# Patient Record
Sex: MALE | Race: WHITE | NOT HISPANIC OR LATINO
[De-identification: names, ages, dates, MRNs, and addresses within clinical notes are randomized per-mention and may not be internally consistent; named-entity substitution may affect disease eponyms.]

---

## 2017-10-12 ENCOUNTER — NON-APPOINTMENT (OUTPATIENT)
Age: 62
End: 2017-10-12

## 2017-10-12 ENCOUNTER — LABORATORY RESULT (OUTPATIENT)
Age: 62
End: 2017-10-12

## 2017-10-12 ENCOUNTER — APPOINTMENT (OUTPATIENT)
Dept: FAMILY MEDICINE | Facility: CLINIC | Age: 62
End: 2017-10-12
Payer: COMMERCIAL

## 2017-10-12 VITALS
OXYGEN SATURATION: 98 % | HEIGHT: 65 IN | DIASTOLIC BLOOD PRESSURE: 80 MMHG | BODY MASS INDEX: 32.49 KG/M2 | WEIGHT: 195 LBS | HEART RATE: 57 BPM | SYSTOLIC BLOOD PRESSURE: 134 MMHG

## 2017-10-12 PROCEDURE — 99214 OFFICE O/P EST MOD 30 MIN: CPT | Mod: 25

## 2017-10-12 PROCEDURE — 93000 ELECTROCARDIOGRAM COMPLETE: CPT

## 2017-10-12 PROCEDURE — 36415 COLL VENOUS BLD VENIPUNCTURE: CPT

## 2017-10-16 LAB
25(OH)D3 SERPL-MCNC: 38.7 NG/ML
ALBUMIN SERPL ELPH-MCNC: 4.3 G/DL
ALP BLD-CCNC: 88 U/L
ALT SERPL-CCNC: 21 U/L
ANION GAP SERPL CALC-SCNC: 16 MMOL/L
AST SERPL-CCNC: 25 U/L
B BURGDOR IGG+IGM SER QL IB: NORMAL
BASOPHILS # BLD AUTO: 0.01 K/UL
BASOPHILS NFR BLD AUTO: 0.1 %
BILIRUB SERPL-MCNC: 0.7 MG/DL
BUN SERPL-MCNC: 20 MG/DL
CALCIUM SERPL-MCNC: 9.9 MG/DL
CHLORIDE SERPL-SCNC: 99 MMOL/L
CHOLEST SERPL-MCNC: 141 MG/DL
CHOLEST/HDLC SERPL: 2.9 RATIO
CO2 SERPL-SCNC: 24 MMOL/L
CREAT SERPL-MCNC: 1.03 MG/DL
EOSINOPHIL # BLD AUTO: 0.22 K/UL
EOSINOPHIL NFR BLD AUTO: 3.2 %
GLUCOSE SERPL-MCNC: 87 MG/DL
HBA1C MFR BLD HPLC: 5.5 %
HCT VFR BLD CALC: 42.5 %
HDLC SERPL-MCNC: 48 MG/DL
HGB BLD-MCNC: 14.7 G/DL
IMM GRANULOCYTES NFR BLD AUTO: 0.3 %
LDLC SERPL CALC-MCNC: 58 MG/DL
LYMPHOCYTES # BLD AUTO: 1.56 K/UL
LYMPHOCYTES NFR BLD AUTO: 22.6 %
MAN DIFF?: NORMAL
MCHC RBC-ENTMCNC: 33.6 PG
MCHC RBC-ENTMCNC: 34.6 GM/DL
MCV RBC AUTO: 97 FL
MONOCYTES # BLD AUTO: 0.48 K/UL
MONOCYTES NFR BLD AUTO: 6.9 %
NEUTROPHILS # BLD AUTO: 4.62 K/UL
NEUTROPHILS NFR BLD AUTO: 66.9 %
PLATELET # BLD AUTO: 224 K/UL
POTASSIUM SERPL-SCNC: 4.6 MMOL/L
PROT SERPL-MCNC: 7.5 G/DL
PSA SERPL-MCNC: 1.07 NG/ML
RBC # BLD: 4.38 M/UL
RBC # FLD: 12.8 %
SODIUM SERPL-SCNC: 139 MMOL/L
T4 SERPL-MCNC: 8.8 UG/DL
TRIGL SERPL-MCNC: 174 MG/DL
TSH SERPL-ACNC: 2.06 UIU/ML
URATE SERPL-MCNC: 4.1 MG/DL
VIT B12 SERPL-MCNC: 994 PG/ML
WBC # FLD AUTO: 6.91 K/UL

## 2017-10-18 ENCOUNTER — APPOINTMENT (OUTPATIENT)
Dept: FAMILY MEDICINE | Facility: CLINIC | Age: 62
End: 2017-10-18

## 2019-02-04 ENCOUNTER — APPOINTMENT (OUTPATIENT)
Dept: FAMILY MEDICINE | Facility: CLINIC | Age: 64
End: 2019-02-04
Payer: COMMERCIAL

## 2019-02-04 VITALS
RESPIRATION RATE: 16 BRPM | BODY MASS INDEX: 30.82 KG/M2 | TEMPERATURE: 98.3 F | HEIGHT: 65 IN | WEIGHT: 185 LBS | SYSTOLIC BLOOD PRESSURE: 120 MMHG | OXYGEN SATURATION: 98 % | HEART RATE: 64 BPM | DIASTOLIC BLOOD PRESSURE: 64 MMHG

## 2019-02-04 DIAGNOSIS — K40.90 UNILATERAL INGUINAL HERNIA, W/OUT OBSTRUCTION OR GANGRENE, NOT SPECIFIED AS RECURRENT: ICD-10-CM

## 2019-02-04 PROCEDURE — 99213 OFFICE O/P EST LOW 20 MIN: CPT

## 2019-02-06 ENCOUNTER — APPOINTMENT (OUTPATIENT)
Dept: SURGERY | Facility: CLINIC | Age: 64
End: 2019-02-06
Payer: COMMERCIAL

## 2019-02-06 VITALS
BODY MASS INDEX: 31.49 KG/M2 | HEART RATE: 68 BPM | SYSTOLIC BLOOD PRESSURE: 127 MMHG | WEIGHT: 189 LBS | HEIGHT: 65 IN | DIASTOLIC BLOOD PRESSURE: 80 MMHG | OXYGEN SATURATION: 97 % | TEMPERATURE: 98.6 F

## 2019-02-06 PROCEDURE — ZZZZZ: CPT

## 2019-02-13 ENCOUNTER — OUTPATIENT (OUTPATIENT)
Dept: OUTPATIENT SERVICES | Facility: HOSPITAL | Age: 64
LOS: 1 days | Discharge: HOME | End: 2019-02-13
Payer: COMMERCIAL

## 2019-02-13 VITALS
HEIGHT: 65 IN | RESPIRATION RATE: 18 BRPM | HEART RATE: 60 BPM | WEIGHT: 192.9 LBS | TEMPERATURE: 98 F | DIASTOLIC BLOOD PRESSURE: 76 MMHG | SYSTOLIC BLOOD PRESSURE: 148 MMHG | OXYGEN SATURATION: 99 %

## 2019-02-13 DIAGNOSIS — K40.90 UNILATERAL INGUINAL HERNIA, WITHOUT OBSTRUCTION OR GANGRENE, NOT SPECIFIED AS RECURRENT: ICD-10-CM

## 2019-02-13 DIAGNOSIS — Z98.890 OTHER SPECIFIED POSTPROCEDURAL STATES: Chronic | ICD-10-CM

## 2019-02-13 DIAGNOSIS — S86.009A UNSPECIFIED INJURY OF UNSPECIFIED ACHILLES TENDON, INITIAL ENCOUNTER: Chronic | ICD-10-CM

## 2019-02-13 DIAGNOSIS — G56.01 CARPAL TUNNEL SYNDROME, RIGHT UPPER LIMB: Chronic | ICD-10-CM

## 2019-02-13 LAB
ANION GAP SERPL CALC-SCNC: 5 MMOL/L — SIGNIFICANT CHANGE UP (ref 5–17)
APTT BLD: 32.3 SEC — SIGNIFICANT CHANGE UP (ref 27.5–36.3)
BASOPHILS # BLD AUTO: 0.01 K/UL — SIGNIFICANT CHANGE UP (ref 0–0.2)
BASOPHILS NFR BLD AUTO: 0.2 % — SIGNIFICANT CHANGE UP (ref 0–2)
BUN SERPL-MCNC: 18 MG/DL — SIGNIFICANT CHANGE UP (ref 7–23)
CALCIUM SERPL-MCNC: 8.9 MG/DL — SIGNIFICANT CHANGE UP (ref 8.5–10.1)
CHLORIDE SERPL-SCNC: 108 MMOL/L — SIGNIFICANT CHANGE UP (ref 96–108)
CO2 SERPL-SCNC: 28 MMOL/L — SIGNIFICANT CHANGE UP (ref 22–31)
CREAT SERPL-MCNC: 0.97 MG/DL — SIGNIFICANT CHANGE UP (ref 0.5–1.3)
EOSINOPHIL # BLD AUTO: 0.16 K/UL — SIGNIFICANT CHANGE UP (ref 0–0.5)
EOSINOPHIL NFR BLD AUTO: 3.1 % — SIGNIFICANT CHANGE UP (ref 0–6)
GLUCOSE SERPL-MCNC: 97 MG/DL — SIGNIFICANT CHANGE UP (ref 70–99)
HCT VFR BLD CALC: 41.9 % — SIGNIFICANT CHANGE UP (ref 39–50)
HGB BLD-MCNC: 14.2 G/DL — SIGNIFICANT CHANGE UP (ref 13–17)
IMM GRANULOCYTES NFR BLD AUTO: 0.4 % — SIGNIFICANT CHANGE UP (ref 0–1.5)
INR BLD: 1.06 RATIO — SIGNIFICANT CHANGE UP (ref 0.88–1.16)
LYMPHOCYTES # BLD AUTO: 1.33 K/UL — SIGNIFICANT CHANGE UP (ref 1–3.3)
LYMPHOCYTES # BLD AUTO: 26 % — SIGNIFICANT CHANGE UP (ref 13–44)
MCHC RBC-ENTMCNC: 33.4 PG — SIGNIFICANT CHANGE UP (ref 27–34)
MCHC RBC-ENTMCNC: 33.9 GM/DL — SIGNIFICANT CHANGE UP (ref 32–36)
MCV RBC AUTO: 98.6 FL — SIGNIFICANT CHANGE UP (ref 80–100)
MONOCYTES # BLD AUTO: 0.46 K/UL — SIGNIFICANT CHANGE UP (ref 0–0.9)
MONOCYTES NFR BLD AUTO: 9 % — SIGNIFICANT CHANGE UP (ref 2–14)
NEUTROPHILS # BLD AUTO: 3.13 K/UL — SIGNIFICANT CHANGE UP (ref 1.8–7.4)
NEUTROPHILS NFR BLD AUTO: 61.3 % — SIGNIFICANT CHANGE UP (ref 43–77)
NRBC # BLD: 0 /100 WBCS — SIGNIFICANT CHANGE UP (ref 0–0)
PLATELET # BLD AUTO: 199 K/UL — SIGNIFICANT CHANGE UP (ref 150–400)
POTASSIUM SERPL-MCNC: 4.1 MMOL/L — SIGNIFICANT CHANGE UP (ref 3.5–5.3)
POTASSIUM SERPL-SCNC: 4.1 MMOL/L — SIGNIFICANT CHANGE UP (ref 3.5–5.3)
PROTHROM AB SERPL-ACNC: 11.8 SEC — SIGNIFICANT CHANGE UP (ref 10–12.9)
RBC # BLD: 4.25 M/UL — SIGNIFICANT CHANGE UP (ref 4.2–5.8)
RBC # FLD: 12.3 % — SIGNIFICANT CHANGE UP (ref 10.3–14.5)
SODIUM SERPL-SCNC: 141 MMOL/L — SIGNIFICANT CHANGE UP (ref 135–145)
WBC # BLD: 5.11 K/UL — SIGNIFICANT CHANGE UP (ref 3.8–10.5)
WBC # FLD AUTO: 5.11 K/UL — SIGNIFICANT CHANGE UP (ref 3.8–10.5)

## 2019-02-13 PROCEDURE — 71046 X-RAY EXAM CHEST 2 VIEWS: CPT | Mod: 26

## 2019-02-13 NOTE — H&P PST ADULT - PSH
Achilles tendon injury  Right torn achilles- age 16  Carpal tunnel syndrome of right wrist  2016  H/O colonoscopy  2016  History of open heart surgery  CABG x3- 2015

## 2019-02-13 NOTE — H&P PST ADULT - TEACHING/LEARNING LEARNING PREFERENCES
audio/group instruction/skill demonstration/computer/internet/pictorial/video/written material/individual instruction/verbal instruction

## 2019-02-13 NOTE — H&P PST ADULT - CARDIOVASCULAR COMMENTS
HTN, Hyperlipidemia, CAD- CABG x3 2015 stable follows with cardio Dr Sanchez regularly- clearance for surgery well healed sternal scar

## 2019-02-13 NOTE — H&P PST ADULT - RS GEN PE MLT RESP DETAILS PC
respirations non-labored/breath sounds equal/no rales/no wheezes/clear to auscultation bilaterally/no rhonchi

## 2019-02-13 NOTE — H&P PST ADULT - ASSESSMENT
63 y.o male scheduled for r Laparoscopic possible open right inguinal hernia repair   Plan  1. Stop all NSAIDS, herbal supplements and vitamins for 7 days.  2. NPO at midnight.  3. Take the following medications Metoprolol, Ramipril, Isosorbide with small sips of water on the morning of your procedure/surgery.  4. Use EZ sponges as directed  5. Use mupirocin as directed  6. Labs, EKG, CXR as per surgeon  7. Cardiologist Dr Sanchez clearance for optimization prior to surgery as per surgeon.

## 2019-02-13 NOTE — H&P PST ADULT - HISTORY OF PRESENT ILLNESS
63 y.o male presents for PST with hx right inguinal hernia dx about 2-3 yrs ago, declined surgery at that time, was not causing any discomfort until about 3-4 weeks ago lifting 190lb cylinder for car, felt increased pain in right inguinal area, hernia became more prominent , followed with PCP, referred to surgeon now scheduled for Laparoscopic possible open right inguinal hernia repair

## 2019-02-19 ENCOUNTER — APPOINTMENT (OUTPATIENT)
Dept: SURGERY | Facility: HOSPITAL | Age: 64
End: 2019-02-19

## 2019-02-19 PROBLEM — I25.10 ATHEROSCLEROTIC HEART DISEASE OF NATIVE CORONARY ARTERY WITHOUT ANGINA PECTORIS: Chronic | Status: ACTIVE | Noted: 2019-02-13

## 2019-02-19 PROBLEM — E78.5 HYPERLIPIDEMIA, UNSPECIFIED: Chronic | Status: ACTIVE | Noted: 2019-02-13

## 2019-02-19 PROBLEM — I10 ESSENTIAL (PRIMARY) HYPERTENSION: Chronic | Status: ACTIVE | Noted: 2019-02-13

## 2019-02-28 ENCOUNTER — OUTPATIENT (OUTPATIENT)
Dept: OUTPATIENT SERVICES | Facility: HOSPITAL | Age: 64
LOS: 1 days | Discharge: HOME | End: 2019-02-28
Payer: COMMERCIAL

## 2019-02-28 ENCOUNTER — APPOINTMENT (OUTPATIENT)
Dept: SURGERY | Facility: HOSPITAL | Age: 64
End: 2019-02-28

## 2019-02-28 VITALS
OXYGEN SATURATION: 99 % | WEIGHT: 192.9 LBS | RESPIRATION RATE: 14 BRPM | DIASTOLIC BLOOD PRESSURE: 68 MMHG | SYSTOLIC BLOOD PRESSURE: 106 MMHG | HEART RATE: 53 BPM | TEMPERATURE: 98 F | HEIGHT: 65 IN

## 2019-02-28 VITALS
TEMPERATURE: 98 F | HEART RATE: 67 BPM | DIASTOLIC BLOOD PRESSURE: 70 MMHG | RESPIRATION RATE: 16 BRPM | SYSTOLIC BLOOD PRESSURE: 126 MMHG | OXYGEN SATURATION: 96 %

## 2019-02-28 DIAGNOSIS — Z98.890 OTHER SPECIFIED POSTPROCEDURAL STATES: Chronic | ICD-10-CM

## 2019-02-28 DIAGNOSIS — S86.009A UNSPECIFIED INJURY OF UNSPECIFIED ACHILLES TENDON, INITIAL ENCOUNTER: Chronic | ICD-10-CM

## 2019-02-28 DIAGNOSIS — G56.01 CARPAL TUNNEL SYNDROME, RIGHT UPPER LIMB: Chronic | ICD-10-CM

## 2019-02-28 PROCEDURE — 49650 LAP ING HERNIA REPAIR INIT: CPT

## 2019-02-28 RX ORDER — SODIUM CHLORIDE 9 MG/ML
1000 INJECTION, SOLUTION INTRAVENOUS
Qty: 0 | Refills: 0 | Status: DISCONTINUED | OUTPATIENT
Start: 2019-02-28 | End: 2019-02-28

## 2019-02-28 RX ORDER — MUPIROCIN 20 MG/G
1 OINTMENT TOPICAL ONCE
Qty: 0 | Refills: 0 | Status: DISCONTINUED | OUTPATIENT
Start: 2019-02-28 | End: 2019-03-15

## 2019-02-28 RX ORDER — LISINOPRIL 2.5 MG/1
1 TABLET ORAL
Qty: 0 | Refills: 0 | DISCHARGE
Start: 2019-02-28

## 2019-02-28 RX ORDER — METOPROLOL TARTRATE 50 MG
25 TABLET ORAL DAILY
Qty: 0 | Refills: 0 | Status: DISCONTINUED | OUTPATIENT
Start: 2019-02-28 | End: 2019-03-15

## 2019-02-28 RX ORDER — ATORVASTATIN CALCIUM 80 MG/1
1 TABLET, FILM COATED ORAL
Qty: 0 | Refills: 0 | DISCHARGE
Start: 2019-02-28

## 2019-02-28 RX ORDER — OXYCODONE HYDROCHLORIDE 5 MG/1
1 TABLET ORAL
Qty: 12 | Refills: 0
Start: 2019-02-28 | End: 2019-03-02

## 2019-02-28 RX ORDER — ATORVASTATIN CALCIUM 80 MG/1
40 TABLET, FILM COATED ORAL DAILY
Qty: 0 | Refills: 0 | Status: DISCONTINUED | OUTPATIENT
Start: 2019-02-28 | End: 2019-02-28

## 2019-02-28 RX ORDER — ATORVASTATIN CALCIUM 80 MG/1
1 TABLET, FILM COATED ORAL
Qty: 0 | Refills: 0 | COMMUNITY

## 2019-02-28 RX ORDER — OXYCODONE HYDROCHLORIDE 5 MG/1
10 TABLET ORAL ONCE
Qty: 0 | Refills: 0 | Status: DISCONTINUED | OUTPATIENT
Start: 2019-02-28 | End: 2019-02-28

## 2019-02-28 RX ORDER — ASPIRIN/CALCIUM CARB/MAGNESIUM 324 MG
81 TABLET ORAL DAILY
Qty: 0 | Refills: 0 | Status: DISCONTINUED | OUTPATIENT
Start: 2019-02-28 | End: 2019-03-15

## 2019-02-28 RX ORDER — ONDANSETRON 8 MG/1
4 TABLET, FILM COATED ORAL ONCE
Qty: 0 | Refills: 0 | Status: COMPLETED | OUTPATIENT
Start: 2019-02-28 | End: 2019-02-28

## 2019-02-28 RX ORDER — ISOSORBIDE MONONITRATE 60 MG/1
60 TABLET, EXTENDED RELEASE ORAL DAILY
Qty: 0 | Refills: 0 | Status: DISCONTINUED | OUTPATIENT
Start: 2019-02-28 | End: 2019-03-15

## 2019-02-28 RX ORDER — SODIUM CHLORIDE 9 MG/ML
1000 INJECTION, SOLUTION INTRAVENOUS
Qty: 0 | Refills: 0 | Status: DISCONTINUED | OUTPATIENT
Start: 2019-02-28 | End: 2019-03-15

## 2019-02-28 RX ORDER — FENTANYL CITRATE 50 UG/ML
50 INJECTION INTRAVENOUS
Qty: 0 | Refills: 0 | Status: DISCONTINUED | OUTPATIENT
Start: 2019-02-28 | End: 2019-02-28

## 2019-02-28 RX ORDER — ATORVASTATIN CALCIUM 80 MG/1
40 TABLET, FILM COATED ORAL AT BEDTIME
Qty: 0 | Refills: 0 | Status: DISCONTINUED | OUTPATIENT
Start: 2019-02-28 | End: 2019-03-15

## 2019-02-28 RX ORDER — LISINOPRIL 2.5 MG/1
2.5 TABLET ORAL DAILY
Qty: 0 | Refills: 0 | Status: DISCONTINUED | OUTPATIENT
Start: 2019-02-28 | End: 2019-03-15

## 2019-02-28 RX ADMIN — FENTANYL CITRATE 50 MICROGRAM(S): 50 INJECTION INTRAVENOUS at 14:15

## 2019-02-28 RX ADMIN — ONDANSETRON 4 MILLIGRAM(S): 8 TABLET, FILM COATED ORAL at 15:47

## 2019-02-28 RX ADMIN — FENTANYL CITRATE 50 MICROGRAM(S): 50 INJECTION INTRAVENOUS at 14:00

## 2019-02-28 RX ADMIN — FENTANYL CITRATE 50 MICROGRAM(S): 50 INJECTION INTRAVENOUS at 14:30

## 2019-02-28 RX ADMIN — OXYCODONE HYDROCHLORIDE 10 MILLIGRAM(S): 5 TABLET ORAL at 14:00

## 2019-02-28 RX ADMIN — OXYCODONE HYDROCHLORIDE 10 MILLIGRAM(S): 5 TABLET ORAL at 14:45

## 2019-02-28 NOTE — ASU DISCHARGE PLAN (ADULT/PEDIATRIC). - NURSING INSTRUCTIONS
CALL the DOCTOR:    - Any pain that appears to be getting worse.  -  If you have  not urinated 8 hours after surgery or have any difficulty urinating.   A responsible adult should be with you for the rest of the day and night for your safety and to help you if you needed.  Review attached FACT SHEET if applicable.

## 2019-02-28 NOTE — ASU DISCHARGE PLAN (ADULT/PEDIATRIC). - MEDICATION SUMMARY - MEDICATIONS TO TAKE
I will START or STAY ON the medications listed below when I get home from the hospital:    Abby Aspirin  -- 81 milligram(s) by mouth once a day  -- Indication: For HTN    lisinopril 2.5 mg oral tablet  -- 1 tab(s) by mouth once a day  -- Indication: For HTN    ramipril 2.5 mg oral capsule  -- 1 cap(s) by mouth once a day  -- Indication: For HTN    isosorbide mononitrate 60 mg oral tablet, extended release  -- 1 tab(s) by mouth once a day (in the morning)  -- Indication: For CAD    atorvastatin 40 mg oral tablet  -- 1 tab(s) by mouth once a day  -- Indication: For HLD    metoprolol succinate 25 mg oral tablet, extended release  -- 1 tab(s) by mouth once a day  -- Indication: For HTN    mupirocin 2% nasal ointment  -- 1 application into nose 2 times a day  -- Indication: For other    Multiple Vitamins oral tablet  -- 1 tab(s) by mouth once a day  -- Indication: For other

## 2019-02-28 NOTE — ASU DISCHARGE PLAN (ADULT/PEDIATRIC). - NOTIFY
Unable to Urinate/Fever greater than 101/Bleeding that does not stop/Pain not relieved by Medications

## 2019-02-28 NOTE — BRIEF OPERATIVE NOTE - PROCEDURE
<<-----Click on this checkbox to enter Procedure Laparoscopic inguinal hernia repair  02/28/2019  right with mesh  Active  DGALVIN

## 2019-02-28 NOTE — ASU PATIENT PROFILE, ADULT - TEACHING/LEARNING LEARNING PREFERENCES
video/verbal instruction/written material/audio/individual instruction/skill demonstration/pictorial/computer/internet/group instruction

## 2019-02-28 NOTE — BRIEF OPERATIVE NOTE - POST-OP DX
Inguinal hernia of right side without obstruction or gangrene  02/28/2019  direct incarcerated  Active  Wilner Song

## 2019-03-06 DIAGNOSIS — K40.90 UNILATERAL INGUINAL HERNIA, WITHOUT OBSTRUCTION OR GANGRENE, NOT SPECIFIED AS RECURRENT: ICD-10-CM

## 2019-03-06 DIAGNOSIS — Z95.1 PRESENCE OF AORTOCORONARY BYPASS GRAFT: ICD-10-CM

## 2019-03-06 DIAGNOSIS — I10 ESSENTIAL (PRIMARY) HYPERTENSION: ICD-10-CM

## 2019-03-06 DIAGNOSIS — I25.10 ATHEROSCLEROTIC HEART DISEASE OF NATIVE CORONARY ARTERY WITHOUT ANGINA PECTORIS: ICD-10-CM

## 2019-03-06 DIAGNOSIS — Z87.891 PERSONAL HISTORY OF NICOTINE DEPENDENCE: ICD-10-CM

## 2019-03-06 DIAGNOSIS — E78.5 HYPERLIPIDEMIA, UNSPECIFIED: ICD-10-CM

## 2019-03-06 DIAGNOSIS — Z79.82 LONG TERM (CURRENT) USE OF ASPIRIN: ICD-10-CM

## 2019-03-06 DIAGNOSIS — K40.30 UNILATERAL INGUINAL HERNIA, WITH OBSTRUCTION, WITHOUT GANGRENE, NOT SPECIFIED AS RECURRENT: ICD-10-CM

## 2019-03-20 ENCOUNTER — APPOINTMENT (OUTPATIENT)
Dept: SURGERY | Facility: CLINIC | Age: 64
End: 2019-03-20

## 2019-03-20 ENCOUNTER — APPOINTMENT (OUTPATIENT)
Dept: SURGERY | Facility: CLINIC | Age: 64
End: 2019-03-20
Payer: COMMERCIAL

## 2019-03-20 VITALS
SYSTOLIC BLOOD PRESSURE: 129 MMHG | OXYGEN SATURATION: 97 % | TEMPERATURE: 98.3 F | BODY MASS INDEX: 31.32 KG/M2 | DIASTOLIC BLOOD PRESSURE: 75 MMHG | HEIGHT: 65 IN | WEIGHT: 188 LBS | HEART RATE: 64 BPM

## 2019-03-20 PROCEDURE — 99024 POSTOP FOLLOW-UP VISIT: CPT

## 2019-03-20 NOTE — HISTORY OF PRESENT ILLNESS
[de-identified] : Status post right inguinal hernia repair, laparoscopic with mesh. No complaints. Her issues.

## 2019-03-20 NOTE — REASON FOR VISIT
[Follow-Up] : a follow-up visit [FreeTextEntry1] : A 63-year-old male, status post laparoscopic right inguinal hernia repair with mesh.

## 2019-03-20 NOTE — PHYSICAL EXAM
[de-identified] : NAD [de-identified] : Abdomen soft, nontender port site is healing well. He will hernia site, unremarkable.

## 2019-05-05 ENCOUNTER — RESULT CHARGE (OUTPATIENT)
Age: 64
End: 2019-05-05

## 2019-10-01 ENCOUNTER — LABORATORY RESULT (OUTPATIENT)
Age: 64
End: 2019-10-01

## 2019-10-01 ENCOUNTER — APPOINTMENT (OUTPATIENT)
Dept: FAMILY MEDICINE | Facility: CLINIC | Age: 64
End: 2019-10-01
Payer: COMMERCIAL

## 2019-10-01 VITALS
HEART RATE: 54 BPM | OXYGEN SATURATION: 99 % | WEIGHT: 185 LBS | HEIGHT: 65 IN | SYSTOLIC BLOOD PRESSURE: 122 MMHG | DIASTOLIC BLOOD PRESSURE: 64 MMHG | BODY MASS INDEX: 30.82 KG/M2

## 2019-10-01 PROCEDURE — 99397 PER PM REEVAL EST PAT 65+ YR: CPT | Mod: 25

## 2019-10-01 PROCEDURE — G0444 DEPRESSION SCREEN ANNUAL: CPT

## 2019-10-02 LAB
ALBUMIN SERPL ELPH-MCNC: 4.7 G/DL
ALP BLD-CCNC: 91 U/L
ALT SERPL-CCNC: 27 U/L
ANION GAP SERPL CALC-SCNC: 10 MMOL/L
AST SERPL-CCNC: 24 U/L
B BURGDOR IGG+IGM SER QL IB: NORMAL
BASOPHILS # BLD AUTO: 0.03 K/UL
BASOPHILS NFR BLD AUTO: 0.6 %
BILIRUB SERPL-MCNC: 0.9 MG/DL
BUN SERPL-MCNC: 18 MG/DL
CALCIUM SERPL-MCNC: 10.2 MG/DL
CHLORIDE SERPL-SCNC: 101 MMOL/L
CHOLEST SERPL-MCNC: 148 MG/DL
CHOLEST/HDLC SERPL: 3 RATIO
CO2 SERPL-SCNC: 26 MMOL/L
CREAT SERPL-MCNC: 1.03 MG/DL
EOSINOPHIL # BLD AUTO: 0.28 K/UL
EOSINOPHIL NFR BLD AUTO: 5.3 %
ESTIMATED AVERAGE GLUCOSE: 108 MG/DL
GLUCOSE SERPL-MCNC: 106 MG/DL
HBA1C MFR BLD HPLC: 5.4 %
HCT VFR BLD CALC: 42.4 %
HDLC SERPL-MCNC: 50 MG/DL
HGB BLD-MCNC: 14.2 G/DL
IMM GRANULOCYTES NFR BLD AUTO: 0.4 %
LDLC SERPL CALC-MCNC: 76 MG/DL
LYMPHOCYTES # BLD AUTO: 1.34 K/UL
LYMPHOCYTES NFR BLD AUTO: 25.4 %
MAN DIFF?: NORMAL
MCHC RBC-ENTMCNC: 32.7 PG
MCHC RBC-ENTMCNC: 33.5 GM/DL
MCV RBC AUTO: 97.7 FL
MONOCYTES # BLD AUTO: 0.44 K/UL
MONOCYTES NFR BLD AUTO: 8.3 %
NEUTROPHILS # BLD AUTO: 3.16 K/UL
NEUTROPHILS NFR BLD AUTO: 60 %
PLATELET # BLD AUTO: 213 K/UL
POTASSIUM SERPL-SCNC: 4.7 MMOL/L
PROT SERPL-MCNC: 6.8 G/DL
PSA SERPL-MCNC: 1.24 NG/ML
RBC # BLD: 4.34 M/UL
RBC # FLD: 12.5 %
SODIUM SERPL-SCNC: 137 MMOL/L
T4 SERPL-MCNC: 8.3 UG/DL
TRIGL SERPL-MCNC: 111 MG/DL
TSH SERPL-ACNC: 1.28 UIU/ML
URATE SERPL-MCNC: 4.9 MG/DL
WBC # FLD AUTO: 5.27 K/UL

## 2019-10-16 ENCOUNTER — APPOINTMENT (OUTPATIENT)
Dept: FAMILY MEDICINE | Facility: CLINIC | Age: 64
End: 2019-10-16
Payer: COMMERCIAL

## 2019-10-16 VITALS
DIASTOLIC BLOOD PRESSURE: 80 MMHG | OXYGEN SATURATION: 98 % | BODY MASS INDEX: 30.82 KG/M2 | HEART RATE: 62 BPM | WEIGHT: 185 LBS | HEIGHT: 65 IN | SYSTOLIC BLOOD PRESSURE: 150 MMHG | RESPIRATION RATE: 16 BRPM

## 2019-10-16 PROCEDURE — 90472 IMMUNIZATION ADMIN EACH ADD: CPT

## 2019-10-16 PROCEDURE — 90632 HEPA VACCINE ADULT IM: CPT

## 2019-10-16 PROCEDURE — G0008: CPT

## 2019-10-16 PROCEDURE — 90653 IIV ADJUVANT VACCINE IM: CPT

## 2019-11-05 ENCOUNTER — APPOINTMENT (OUTPATIENT)
Dept: FAMILY MEDICINE | Facility: CLINIC | Age: 64
End: 2019-11-05
Payer: COMMERCIAL

## 2019-11-05 VITALS
HEIGHT: 65 IN | BODY MASS INDEX: 30.82 KG/M2 | DIASTOLIC BLOOD PRESSURE: 80 MMHG | HEART RATE: 58 BPM | WEIGHT: 185 LBS | OXYGEN SATURATION: 98 % | TEMPERATURE: 98.5 F | RESPIRATION RATE: 16 BRPM | SYSTOLIC BLOOD PRESSURE: 130 MMHG

## 2019-11-05 PROCEDURE — 99213 OFFICE O/P EST LOW 20 MIN: CPT

## 2019-11-05 NOTE — HISTORY OF PRESENT ILLNESS
[FreeTextEntry8] : had tooth pulled 5 days ago pain in area of extraction and right neck neg sore throat or fever on amoxil

## 2020-09-14 ENCOUNTER — APPOINTMENT (OUTPATIENT)
Dept: FAMILY MEDICINE | Facility: CLINIC | Age: 65
End: 2020-09-14
Payer: COMMERCIAL

## 2020-09-14 VITALS
BODY MASS INDEX: 30.82 KG/M2 | DIASTOLIC BLOOD PRESSURE: 70 MMHG | SYSTOLIC BLOOD PRESSURE: 114 MMHG | WEIGHT: 185 LBS | OXYGEN SATURATION: 96 % | RESPIRATION RATE: 16 BRPM | HEIGHT: 65 IN

## 2020-09-14 DIAGNOSIS — Z23 ENCOUNTER FOR IMMUNIZATION: ICD-10-CM

## 2020-09-14 PROCEDURE — G0008: CPT

## 2020-09-14 PROCEDURE — 99214 OFFICE O/P EST MOD 30 MIN: CPT | Mod: 25

## 2020-09-14 PROCEDURE — 90662 IIV NO PRSV INCREASED AG IM: CPT

## 2020-09-14 RX ORDER — SALMONELLA TYPHI TY21A 2 [CFU]/[CFU]
CAPSULE, COATED ORAL
Qty: 4 | Refills: 0 | Status: DISCONTINUED | COMMUNITY
Start: 2019-10-16 | End: 2020-09-14

## 2020-09-15 LAB
25(OH)D3 SERPL-MCNC: 37.6 NG/ML
ALBUMIN SERPL ELPH-MCNC: 4.7 G/DL
ALP BLD-CCNC: 73 U/L
ALT SERPL-CCNC: 27 U/L
ANION GAP SERPL CALC-SCNC: 14 MMOL/L
AST SERPL-CCNC: 28 U/L
BASOPHILS # BLD AUTO: 0.02 K/UL
BASOPHILS NFR BLD AUTO: 0.3 %
BILIRUB SERPL-MCNC: 0.7 MG/DL
BUN SERPL-MCNC: 26 MG/DL
CALCIUM SERPL-MCNC: 9.9 MG/DL
CHLORIDE SERPL-SCNC: 104 MMOL/L
CHOLEST SERPL-MCNC: 147 MG/DL
CHOLEST/HDLC SERPL: 3 RATIO
CO2 SERPL-SCNC: 24 MMOL/L
CREAT SERPL-MCNC: 1 MG/DL
EOSINOPHIL # BLD AUTO: 0.22 K/UL
EOSINOPHIL NFR BLD AUTO: 2.8 %
ESTIMATED AVERAGE GLUCOSE: 111 MG/DL
GLUCOSE SERPL-MCNC: 95 MG/DL
HBA1C MFR BLD HPLC: 5.5 %
HCT VFR BLD CALC: 41.5 %
HDLC SERPL-MCNC: 49 MG/DL
HGB BLD-MCNC: 14.1 G/DL
IMM GRANULOCYTES NFR BLD AUTO: 0.3 %
IRON SERPL-MCNC: 95 UG/DL
LDLC SERPL CALC-MCNC: 79 MG/DL
LYMPHOCYTES # BLD AUTO: 1.59 K/UL
LYMPHOCYTES NFR BLD AUTO: 20.2 %
MAN DIFF?: NORMAL
MCHC RBC-ENTMCNC: 34 GM/DL
MCHC RBC-ENTMCNC: 34 PG
MCV RBC AUTO: 100 FL
MONOCYTES # BLD AUTO: 0.61 K/UL
MONOCYTES NFR BLD AUTO: 7.8 %
NEUTROPHILS # BLD AUTO: 5.4 K/UL
NEUTROPHILS NFR BLD AUTO: 68.6 %
PLATELET # BLD AUTO: 207 K/UL
POTASSIUM SERPL-SCNC: 4.5 MMOL/L
PROT SERPL-MCNC: 6.5 G/DL
RBC # BLD: 4.15 M/UL
RBC # FLD: 12.7 %
SARS-COV-2 IGG SERPL IA-ACNC: 0.09 INDEX
SARS-COV-2 IGG SERPL QL IA: NEGATIVE
SODIUM SERPL-SCNC: 142 MMOL/L
T4 SERPL-MCNC: 8.1 UG/DL
TRIGL SERPL-MCNC: 95 MG/DL
TSH SERPL-ACNC: 1.28 UIU/ML
URATE SERPL-MCNC: 4.6 MG/DL
VIT B12 SERPL-MCNC: 963 PG/ML
WBC # FLD AUTO: 7.86 K/UL

## 2021-10-22 ENCOUNTER — APPOINTMENT (OUTPATIENT)
Dept: FAMILY MEDICINE | Facility: CLINIC | Age: 66
End: 2021-10-22
Payer: MEDICARE

## 2021-10-22 VITALS
SYSTOLIC BLOOD PRESSURE: 124 MMHG | HEIGHT: 65 IN | TEMPERATURE: 97.2 F | DIASTOLIC BLOOD PRESSURE: 70 MMHG | BODY MASS INDEX: 31.32 KG/M2 | WEIGHT: 188 LBS | HEART RATE: 68 BPM | OXYGEN SATURATION: 98 %

## 2021-10-22 DIAGNOSIS — M62.838 OTHER MUSCLE SPASM: ICD-10-CM

## 2021-10-22 PROCEDURE — 99214 OFFICE O/P EST MOD 30 MIN: CPT

## 2021-10-22 RX ORDER — CYCLOBENZAPRINE HYDROCHLORIDE 5 MG/1
5 TABLET, FILM COATED ORAL EVERY 8 HOURS
Qty: 21 | Refills: 0 | Status: ACTIVE | COMMUNITY
Start: 2021-10-22 | End: 1900-01-01

## 2021-10-22 NOTE — REVIEW OF SYSTEMS
[Muscle Pain] : muscle pain [Muscle Weakness] : muscle weakness [Back Pain] : back pain [Negative] : Constitutional

## 2021-10-22 NOTE — ASSESSMENT
[FreeTextEntry1] : conservative tx advised\par given muscle relaxer prn\par take steroids for another 7 days to help inflammation\par cont exercise and stretching. \par

## 2021-10-22 NOTE — HISTORY OF PRESENT ILLNESS
[FreeTextEntry8] : PT presenting for left thigh pain x 1 week\par was lifting/moving 8 ft AC unit and pulled his thigh muscle\par went to UC, given steroids which helped but now pain has returned\par able to do all activities but slightly sore\par

## 2021-11-03 ENCOUNTER — APPOINTMENT (OUTPATIENT)
Dept: FAMILY MEDICINE | Facility: CLINIC | Age: 66
End: 2021-11-03
Payer: MEDICARE

## 2021-11-03 VITALS
HEART RATE: 70 BPM | SYSTOLIC BLOOD PRESSURE: 124 MMHG | DIASTOLIC BLOOD PRESSURE: 72 MMHG | HEIGHT: 65 IN | OXYGEN SATURATION: 97 % | TEMPERATURE: 97.4 F | BODY MASS INDEX: 31.32 KG/M2 | WEIGHT: 188 LBS

## 2021-11-03 DIAGNOSIS — I10 ESSENTIAL (PRIMARY) HYPERTENSION: ICD-10-CM

## 2021-11-03 DIAGNOSIS — Z00.00 ENCOUNTER FOR GENERAL ADULT MEDICAL EXAMINATION W/OUT ABNORMAL FINDINGS: ICD-10-CM

## 2021-11-03 DIAGNOSIS — I25.10 ATHEROSCLEROTIC HEART DISEASE OF NATIVE CORONARY ARTERY W/OUT ANGINA PECTORIS: ICD-10-CM

## 2021-11-03 PROCEDURE — 90662 IIV NO PRSV INCREASED AG IM: CPT

## 2021-11-03 PROCEDURE — G0402 INITIAL PREVENTIVE EXAM: CPT

## 2021-11-03 PROCEDURE — G0008: CPT

## 2021-11-03 NOTE — HISTORY OF PRESENT ILLNESS
[FreeTextEntry1] : annual [de-identified] : annual well active regular exercise healthy diet history of htn hyperlipids cad s/p triple bipass follow with cardiology colonoscopy utd non smoker denies chest pain palpitatoins or dyspnea

## 2021-11-03 NOTE — HEALTH RISK ASSESSMENT
[Very Good] : ~his/her~  mood as very good [] : No [No] : In the past 12 months have you used drugs other than those required for medical reasons? No [No falls in past year] : Patient reported no falls in the past year [Change in mental status noted] : No change in mental status noted [Language] : denies difficulty with language [Behavior] : denies difficulty with behavior [Learning/Retaining New Information] : denies difficulty learning/retaining new information [Handling Complex Tasks] : denies difficulty handling complex tasks [Reasoning] : denies difficulty with reasoning [Spatial Ability and Orientation] : denies difficulty with spatial ability and orientation [None] : None [Fully functional (bathing, dressing, toileting, transferring, walking, feeding)] : Fully functional (bathing, dressing, toileting, transferring, walking, feeding) [Fully functional (using the telephone, shopping, preparing meals, housekeeping, doing laundry, using] : Fully functional and needs no help or supervision to perform IADLs (using the telephone, shopping, preparing meals, housekeeping, doing laundry, using transportation, managing medications and managing finances) [Reports changes in hearing] : Reports no changes in hearing [Reports changes in vision] : Reports no changes in vision [Reports changes in dental health] : Reports no changes in dental health

## 2021-11-08 LAB
25(OH)D3 SERPL-MCNC: 33.4 NG/ML
ESTIMATED AVERAGE GLUCOSE: 111 MG/DL
HBA1C MFR BLD HPLC: 5.5 %
PSA SERPL-MCNC: 1.2 NG/ML
T4 SERPL-MCNC: 6.7 UG/DL
TSH SERPL-ACNC: 1.16 UIU/ML

## 2022-04-01 NOTE — H&P PST ADULT - BP NONINVASIVE MEAN (MM HG)
Pt requesting refill of HYDROCHLOROTHIAZIDE 25 MG Tablet  Last Time Medication was Prescribed :  01/17/2022 qty # 90 with 0 refills    Last Office Visit with Provider: 02/22/2022    Recommended to return by Provider: Followup in 3 months, sooner if dizziness worsening    Appt scheduled on 05/24/2022.      Passed protocol, refill approved, sent to pharmacy  Hypertension Medications Protocol Passed 04/01/2022 04:50 AM   Protocol Details  CMP or BMP in past 12 months    Last serum creatinine< 2.0    Appointment in past 6 or next 3 months 100

## 2022-04-04 ENCOUNTER — RX RENEWAL (OUTPATIENT)
Age: 67
End: 2022-04-04

## 2022-07-12 ENCOUNTER — APPOINTMENT (OUTPATIENT)
Dept: FAMILY MEDICINE | Facility: CLINIC | Age: 67
End: 2022-07-12

## 2022-07-12 VITALS
HEART RATE: 67 BPM | WEIGHT: 191 LBS | TEMPERATURE: 97.2 F | DIASTOLIC BLOOD PRESSURE: 70 MMHG | SYSTOLIC BLOOD PRESSURE: 126 MMHG | HEIGHT: 65 IN | BODY MASS INDEX: 31.82 KG/M2 | OXYGEN SATURATION: 96 %

## 2022-07-12 DIAGNOSIS — M54.2 CERVICALGIA: ICD-10-CM

## 2022-07-12 PROCEDURE — 99213 OFFICE O/P EST LOW 20 MIN: CPT

## 2022-07-12 NOTE — PHYSICAL EXAM
[Normal] : no respiratory distress, lungs were clear to auscultation bilaterally and no accessory muscle use [de-identified] : circ intact right arm strong pulses

## 2022-07-12 NOTE — REVIEW OF SYSTEMS
[Negative] : Respiratory [FreeTextEntry9] : pain and decreased rom of cervical spine pain and numbness right arm

## 2022-07-12 NOTE — HISTORY OF PRESENT ILLNESS
[FreeTextEntry8] : pain and numbness of right arm had carpal tunnel surgery with little relief cervical pain

## 2022-07-27 ENCOUNTER — APPOINTMENT (OUTPATIENT)
Dept: MRI IMAGING | Facility: CLINIC | Age: 67
End: 2022-07-27

## 2022-07-27 ENCOUNTER — OUTPATIENT (OUTPATIENT)
Dept: OUTPATIENT SERVICES | Facility: HOSPITAL | Age: 67
LOS: 1 days | End: 2022-07-27

## 2022-07-27 DIAGNOSIS — G56.01 CARPAL TUNNEL SYNDROME, RIGHT UPPER LIMB: Chronic | ICD-10-CM

## 2022-07-27 DIAGNOSIS — S86.009A UNSPECIFIED INJURY OF UNSPECIFIED ACHILLES TENDON, INITIAL ENCOUNTER: Chronic | ICD-10-CM

## 2022-07-27 DIAGNOSIS — M54.2 CERVICALGIA: ICD-10-CM

## 2022-07-27 DIAGNOSIS — Z98.890 OTHER SPECIFIED POSTPROCEDURAL STATES: Chronic | ICD-10-CM

## 2022-07-27 PROCEDURE — 72141 MRI NECK SPINE W/O DYE: CPT | Mod: 26

## 2022-07-28 NOTE — ASU PATIENT PROFILE, ADULT - FALL HARM RISK CONCLUSION
Last visit: 5/16/22  Last Med refill: 6/29/22  Does patient have enough medication for 72 hours: No:     Next Visit Date:  Future Appointments   Date Time Provider Jose Cruz Scruggs   9/19/2022 10:45 AM CARMITA Tobin - [facility]   10/11/2022 10:00 AM Manfred Baldwin  [address] Maintenance   Topic Date Due    Pneumococcal 65+ years Vaccine (1 - PCV) Never done    Diabetic retinal exam  Never done    Colorectal Cancer Screen  Never done    Shingles vaccine (1 of 2) Never done    COVID-19 Vaccine (2 - Booster for Marysol series) 07/08/2021    Diabetic foot exam  03/23/2022    Lipids  06/17/2022    Flu vaccine (1) 09/01/2022    A1C test (Diabetic or Prediabetic)  01/10/2023    Depression Screen  05/16/2023    DTaP/Tdap/Td vaccine (2 - Td or Tdap) 07/10/2023    Hepatitis C screen  Completed    Hepatitis A vaccine  Aged Out    Hib vaccine  Aged Out    Meningococcal (ACWY) vaccine  Aged Out       Hemoglobin A1C (%)   Date Value   01/10/2022 5.6   07/13/2021 6.1   10/28/2020 5.5             ( goal A1C is < 7)   Microalb/Crt.  Ratio (mcg/mg creat)   Date Value   11/02/2020 38 (H)     LDL Cholesterol (mg/dL)   Date Value   06/17/2021 134 (H)   02/18/2020 121       (goal LDL is <100)   AST (U/L)   Date Value   06/01/2022 14     ALT (U/L)   Date Value   06/01/2022 6     BUN (mg/dL)   Date Value   06/01/2022 17     BP Readings from Last 3 Encounters:   06/01/22 (!) 151/84   05/16/22 136/84   01/10/22 132/80          (goal 120/80)    All Future Testing planned in [facility]  Lab Frequency Next Occurrence               Patient Active Problem List:     Back pain     Essential hypertension     COPD (chronic obstructive pulmonary disease) (HCC)     Lung nodule     Procedure refused     Hyperlipidemia     Colonoscopy refused     Atrial fibrillation, chronic (HCC)     Anxiety     Insomnia     Pacemaker battery depletion     Pacemaker     Bradycardia     Constipation     Diet-controlled diabetes mellitus ([facility] [address]     Aneurysm of infrarenal abdominal aorta (HCC)     CLIFTON (acute kidney injury) ([facility] [address]     Hyperkalemia     Acute cholecystitis Universal Safety Interventions

## 2022-08-05 NOTE — H&P PST ADULT - GASTROINTESTINAL COMMENTS
[address] [facility]    Please Schedule the following with: Mckayla  Today's Date:  8/5/22     Patient: Harsh Gibson     YOB: 1981    Patient Home Phone: [phone] (home)    Diagnosis: left paracentral disc extrusion C6-7, left cervical radiculitis     [x]LT     []RT     []OMAR     []Midline    Levels: C6-7 #1    [x]Cervical AMIE 71101, 90165  []L-MBB 12600, 82641  []SI Joint 32869   []C-FACET 47157, 76137, 53008  []L-FACET [id number], 48410  []Interlaminar AMIE 44606     []HIP 33953    []C-MBB  []Transforaminal AMIE 87285  []Neurotomy 41115, 84213, 17806    Attending Provider: Nasima Herrera PA-C    Injection Schedule for: At: [facility]  First Insurance:                                               Pre-cert #:  Second Insurance:                 Pre-cert #:    Comments:  Follow up with ROXANNE Busby 2weeks after procedure: [phone]    SEDATION:       [] IV           [] ORAL     [] Blood Thinner:                 []Diabetic           []Antibiotic:               []Glaucoma:    [] Pacemaker/defib       [] Current Open Wounds, Lacerations or Sores     Allergies: No Known Allergies    Past Medical History:   Diagnosis Date    Paez syndrome           Current Outpatient Medications:     meloxicam (MOBIC) 15 MG tablet, Take 1 tablet by mouth daily as needed for Pain, Disp: 30 tablet, Rfl: 1    methylPREDNISolone (MEDROL, VITOR,) 4 MG tablet, Take by mouth., Disp: 1 kit, Rfl: 0    predniSONE (DELTASONE) 10 MG tablet, Take 1 tablet three times a day for 7 days, take 1 tablet twice a day for 7 days, then take Medrol Dosepak, Disp: 35 tablet, Rfl: 0    pantoprazole (PROTONIX) 40 MG tablet, Take 40 mg by mouth every morning (before breakfast), Disp: , Rfl:
Right inguinal hernia site clear

## 2022-08-11 ENCOUNTER — APPOINTMENT (OUTPATIENT)
Dept: ORTHOPEDIC SURGERY | Facility: CLINIC | Age: 67
End: 2022-08-11

## 2022-08-11 VITALS
BODY MASS INDEX: 31.82 KG/M2 | DIASTOLIC BLOOD PRESSURE: 79 MMHG | HEIGHT: 65 IN | SYSTOLIC BLOOD PRESSURE: 147 MMHG | HEART RATE: 58 BPM | WEIGHT: 191 LBS

## 2022-08-11 PROCEDURE — 72040 X-RAY EXAM NECK SPINE 2-3 VW: CPT

## 2022-08-11 PROCEDURE — 99204 OFFICE O/P NEW MOD 45 MIN: CPT

## 2022-08-11 NOTE — PHYSICAL EXAM
[de-identified] : CONSTITUTIONAL:  Patient is a very pleasant individual who is well-nourished and appears stated age. \par PSYCHIATRIC:  Alert and oriented times three and in no apparent distress, and participates with orthopedic evaluation well.\par HEAD:  Atraumatic and  nonsyndromic in appearance.\par EENT: No thyromegaly, EOMI.\par RESPIRATORY:  Respiratory rate is regular, not dyspneic on examination.\par LYMPHATICS:  There is no cervical or axillary lymphadenopathy.\par INTEGUMENTARY:  Skin is clean, dry, and intact about the bilateral upper extremities and cervical spine. \par VASCULAR:   There is brisk capillary refill about the bilateral upper extremities and radial pulses are 2/4. \par NEUROLOGIC:  Negative L'hirmitte, negative Spurling's sign. There are no pathologic reflexes. There is no decrease in sensation of the bilateral upper extremities on Wartenberg pinwheel examination.  Deep tendon reflexes are well-maintained at +2/4 of the bilateral upper extremities and are symmetric.\par MUSCULOSKELETAL:  There is no visible muscular atrophy.  Manual motor strength is well maintained in the bilateral upper extremities.  Cervical range of motion is well maintained.  The patient ambulates in a non-myelopathic manner. Normal secondary orthopaedic exam of bilateral shoulders, elbows and hands.  Elbow flexion and extension, wrist extension, finger flexion and abduction are well maintained.\par \par   [de-identified] : MRI of the cervical spine has been reviewed from [facility] dated July 27, 2022 shows diffuse cervical spondylosis and a small myelomalacia change posterior to C5-C6 disc space\par \par X-rays of the cervical spine demonstrate loss of cervical lordosis there is cervical kyphosis with multiple levels of cervical degenerative disc disease\par

## 2022-08-11 NOTE — HISTORY OF PRESENT ILLNESS
[Worsening] : worsening [1] : a current pain level of 1/10 [4] : a maximum pain level of 4/10 [Bending] : worsened by bending [Lifting] : worsened by lifting [None] : No relieving factors are noted [Ataxia] : ataxia [Loss of Dexterity] : loss of dexterity [de-identified] : Patient presents for evaluation for known diagnosis of cervical myelopathy patient's been having worsening radiculopathy in the right upper extremity he underwent a carpal tunnel release with no improvement he obtained a cervical MRI that demonstrates C5-C6 cervical myelopathy.  Patient has underlying congenital issue/congenital cervical stenosis.  Presents for surgical conversation.  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 [Incontinence] : no incontinence [Urinary Ret.] : no urinary retention

## 2022-08-11 NOTE — DISCUSSION/SUMMARY
[de-identified] : We have had a very pleasant conversation with regard to cervical surgery with regard to C5-C6 cervical myelopathy patient is a very active  with loss of dexterity and weakness I think being proactive with surgical management in order to halt the progression of cervical myelopathy is very reasonable.  Patient is going to return in 2 to 3 weeks with all of his questions comments concerns.  I am also going to be ordering a cervical CAT scan.

## 2022-08-26 ENCOUNTER — APPOINTMENT (OUTPATIENT)
Dept: CT IMAGING | Facility: CLINIC | Age: 67
End: 2022-08-26

## 2022-08-26 ENCOUNTER — OUTPATIENT (OUTPATIENT)
Dept: OUTPATIENT SERVICES | Facility: HOSPITAL | Age: 67
LOS: 1 days | End: 2022-08-26
Payer: MEDICARE

## 2022-08-26 DIAGNOSIS — Z98.890 OTHER SPECIFIED POSTPROCEDURAL STATES: Chronic | ICD-10-CM

## 2022-08-26 DIAGNOSIS — G95.9 DISEASE OF SPINAL CORD, UNSPECIFIED: ICD-10-CM

## 2022-08-26 DIAGNOSIS — G56.01 CARPAL TUNNEL SYNDROME, RIGHT UPPER LIMB: Chronic | ICD-10-CM

## 2022-08-26 DIAGNOSIS — S86.009A UNSPECIFIED INJURY OF UNSPECIFIED ACHILLES TENDON, INITIAL ENCOUNTER: Chronic | ICD-10-CM

## 2022-08-26 PROCEDURE — 72125 CT NECK SPINE W/O DYE: CPT | Mod: 26

## 2022-08-26 PROCEDURE — 72125 CT NECK SPINE W/O DYE: CPT

## 2022-08-30 ENCOUNTER — APPOINTMENT (OUTPATIENT)
Dept: ORTHOPEDIC SURGERY | Facility: CLINIC | Age: 67
End: 2022-08-30

## 2022-08-30 VITALS — BODY MASS INDEX: 31.82 KG/M2 | HEIGHT: 65 IN | WEIGHT: 191 LBS

## 2022-08-30 PROCEDURE — 99215 OFFICE O/P EST HI 40 MIN: CPT

## 2022-08-30 NOTE — HISTORY OF PRESENT ILLNESS
[de-identified] : Patient presents for surgical conversation with regard to cervical spondylosis and cervical myelopathy patient again states he does Endor is getting progressively worse he notices that he is off balance dexterity is suffering and we have discussed the concept of preventative measures with regard to decompression improves and preventing further/progressive myelopathy and subsequent disability [Worsening] : worsening [___ mths] : [unfilled] month(s) ago [1] : a current pain level of 1/10 [10] : a maximum pain level of 10/10 [Intermit.] : ~He/She~ states the symptoms seem to be intermittent [Bending] : worsened by bending [Lifting] : worsened by lifting [NSAIDs] : relieved by nonsteroidal anti-inflammatory drugs [Rest] : relieved by rest [Ataxia] : no ataxia [Incontinence] : no incontinence [Loss of Dexterity] : good dexterity [Urinary Ret.] : no urinary retention [de-identified] : Cervical extension

## 2022-08-30 NOTE — PHYSICAL EXAM
[Wide-Based] : wide-based [de-identified] : CONSTITUTIONAL:  Patient is a very pleasant individual who is well-nourished and appears stated age. \par PSYCHIATRIC:  Alert and oriented times three and in no apparent distress, and participates with orthopedic evaluation well.\par HEAD:  Atraumatic and  nonsyndromic in appearance.\par EENT: No thyromegaly, EOMI.\par RESPIRATORY:  Respiratory rate is regular, not dyspneic on examination.\par LYMPHATICS:  There is no cervical or axillary lymphadenopathy.\par INTEGUMENTARY:  Skin is clean, dry, and intact about the bilateral upper extremities and cervical spine. \par VASCULAR:   There is brisk capillary refill about the bilateral upper extremities and radial pulses are 2/4. \par NEUROLOGIC: Positive L'hirmitte, positive Spurling's sign. There are no pathologic reflexes. There is a decrease in sensation of the primarily the right upper extremities on Wartenberg pinwheel examination and light touch consistent with a C6 distribution.  Deep tendon reflexes are well-maintained at +2/4 of the bilateral upper extremities and are symmetric.\par MUSCULOSKELETAL:  There is no visible muscular atrophy.  Manual motor strength is well maintained in the bilateral upper extremities however subjectively and manually slightly diminished at approximately 3-4 over 5.  Cervical range of motion is well maintained to approximately 50%.  The patient ambulates in a myelopathic manner. Normal secondary orthopaedic exam of bilateral shoulders, elbows and hands.  Elbow flexion and extension, wrist extension, finger flexion and abduction are well maintained.\par \par   [de-identified] : Previous cervical x-ray has been reviewed from August 11, 2022 2 views of the cervical spine show loss of cervical lordosis patient is presenting with kyphosis and spondylosis primarily from C3-C7 C7-T1 has a large anterior osteophyte.\par Cervical CAT scan has been reviewed from [facility] this is dated August 26, 2022 that demonstrates cervical stenosis with this again pronounced focal kyphosis and severe spinal stenosis at C4-5 and C6-C7 bilateral foraminal stenosis severe from C3-4 through C6-C7.\par MRI of the cervical spine also from [facility] demonstrates again cervical myelopathy primarily behind C5-C6 but it does extend down to C6-C7 and cephalad to C4-C5

## 2022-08-30 NOTE — DISCUSSION/SUMMARY
[de-identified] : We have had a very thorough conversation with regard to ACDF reconstruction of his cervical spine based upon the mechanics and a focal kyphosis of the cervical spine I do think an anterior reconstruction can help restore his cervical lordosis.  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 also the main focus of this operation is to prevent progressive myelopathy and downstream disability patient is aware of this sign and symptom and wishes to pursue surgical management.  He is aware of dysphagia dysphonia etc.  Wishes to pursue surgery is also aware of incomplete resolution of signs and symptoms the potential need for posterior cervical revision surgery with a posterior cervical laminectomy etc.

## 2022-09-15 ENCOUNTER — OUTPATIENT (OUTPATIENT)
Dept: OUTPATIENT SERVICES | Facility: HOSPITAL | Age: 67
LOS: 1 days | End: 2022-09-15
Payer: MEDICARE

## 2022-09-15 VITALS
TEMPERATURE: 98 F | SYSTOLIC BLOOD PRESSURE: 120 MMHG | RESPIRATION RATE: 19 BRPM | OXYGEN SATURATION: 96 % | DIASTOLIC BLOOD PRESSURE: 60 MMHG | HEIGHT: 65 IN | WEIGHT: 197.09 LBS | HEART RATE: 64 BPM

## 2022-09-15 DIAGNOSIS — M47.812 SPONDYLOSIS WITHOUT MYELOPATHY OR RADICULOPATHY, CERVICAL REGION: ICD-10-CM

## 2022-09-15 DIAGNOSIS — Z01.818 ENCOUNTER FOR OTHER PREPROCEDURAL EXAMINATION: ICD-10-CM

## 2022-09-15 DIAGNOSIS — I25.10 ATHEROSCLEROTIC HEART DISEASE OF NATIVE CORONARY ARTERY WITHOUT ANGINA PECTORIS: ICD-10-CM

## 2022-09-15 DIAGNOSIS — M54.12 RADICULOPATHY, CERVICAL REGION: ICD-10-CM

## 2022-09-15 DIAGNOSIS — I10 ESSENTIAL (PRIMARY) HYPERTENSION: ICD-10-CM

## 2022-09-15 DIAGNOSIS — Z98.890 OTHER SPECIFIED POSTPROCEDURAL STATES: Chronic | ICD-10-CM

## 2022-09-15 DIAGNOSIS — Z13.89 ENCOUNTER FOR SCREENING FOR OTHER DISORDER: ICD-10-CM

## 2022-09-15 DIAGNOSIS — Z29.9 ENCOUNTER FOR PROPHYLACTIC MEASURES, UNSPECIFIED: ICD-10-CM

## 2022-09-15 DIAGNOSIS — G56.01 CARPAL TUNNEL SYNDROME, RIGHT UPPER LIMB: Chronic | ICD-10-CM

## 2022-09-15 DIAGNOSIS — S86.009A UNSPECIFIED INJURY OF UNSPECIFIED ACHILLES TENDON, INITIAL ENCOUNTER: Chronic | ICD-10-CM

## 2022-09-15 LAB
A1C WITH ESTIMATED AVERAGE GLUCOSE RESULT: 5.2 % — SIGNIFICANT CHANGE UP (ref 4–5.6)
ANION GAP SERPL CALC-SCNC: 10 MMOL/L — SIGNIFICANT CHANGE UP (ref 5–17)
APTT BLD: 30.3 SEC — SIGNIFICANT CHANGE UP (ref 27.5–35.5)
BASOPHILS # BLD AUTO: 0.02 K/UL — SIGNIFICANT CHANGE UP (ref 0–0.2)
BASOPHILS NFR BLD AUTO: 0.3 % — SIGNIFICANT CHANGE UP (ref 0–2)
BLD GP AB SCN SERPL QL: SIGNIFICANT CHANGE UP
BUN SERPL-MCNC: 19.6 MG/DL — SIGNIFICANT CHANGE UP (ref 8–20)
CALCIUM SERPL-MCNC: 9.4 MG/DL — SIGNIFICANT CHANGE UP (ref 8.4–10.5)
CHLORIDE SERPL-SCNC: 100 MMOL/L — SIGNIFICANT CHANGE UP (ref 98–107)
CO2 SERPL-SCNC: 26 MMOL/L — SIGNIFICANT CHANGE UP (ref 22–29)
CREAT SERPL-MCNC: 0.86 MG/DL — SIGNIFICANT CHANGE UP (ref 0.5–1.3)
EGFR: 94 ML/MIN/1.73M2 — SIGNIFICANT CHANGE UP
EOSINOPHIL # BLD AUTO: 0.2 K/UL — SIGNIFICANT CHANGE UP (ref 0–0.5)
EOSINOPHIL NFR BLD AUTO: 3.1 % — SIGNIFICANT CHANGE UP (ref 0–6)
ESTIMATED AVERAGE GLUCOSE: 103 MG/DL — SIGNIFICANT CHANGE UP (ref 68–114)
GLUCOSE SERPL-MCNC: 91 MG/DL — SIGNIFICANT CHANGE UP (ref 70–99)
HCT VFR BLD CALC: 41.3 % — SIGNIFICANT CHANGE UP (ref 39–50)
HGB BLD-MCNC: 14.3 G/DL — SIGNIFICANT CHANGE UP (ref 13–17)
IMM GRANULOCYTES NFR BLD AUTO: 0.3 % — SIGNIFICANT CHANGE UP (ref 0–0.9)
INR BLD: 1.03 RATIO — SIGNIFICANT CHANGE UP (ref 0.88–1.16)
LYMPHOCYTES # BLD AUTO: 1.4 K/UL — SIGNIFICANT CHANGE UP (ref 1–3.3)
LYMPHOCYTES # BLD AUTO: 22 % — SIGNIFICANT CHANGE UP (ref 13–44)
MCHC RBC-ENTMCNC: 34.4 PG — HIGH (ref 27–34)
MCHC RBC-ENTMCNC: 34.6 GM/DL — SIGNIFICANT CHANGE UP (ref 32–36)
MCV RBC AUTO: 99.3 FL — SIGNIFICANT CHANGE UP (ref 80–100)
MONOCYTES # BLD AUTO: 0.62 K/UL — SIGNIFICANT CHANGE UP (ref 0–0.9)
MONOCYTES NFR BLD AUTO: 9.7 % — SIGNIFICANT CHANGE UP (ref 2–14)
MRSA PCR RESULT.: SIGNIFICANT CHANGE UP
NEUTROPHILS # BLD AUTO: 4.1 K/UL — SIGNIFICANT CHANGE UP (ref 1.8–7.4)
NEUTROPHILS NFR BLD AUTO: 64.6 % — SIGNIFICANT CHANGE UP (ref 43–77)
PLATELET # BLD AUTO: 237 K/UL — SIGNIFICANT CHANGE UP (ref 150–400)
POTASSIUM SERPL-MCNC: 4.7 MMOL/L — SIGNIFICANT CHANGE UP (ref 3.5–5.3)
POTASSIUM SERPL-SCNC: 4.7 MMOL/L — SIGNIFICANT CHANGE UP (ref 3.5–5.3)
PROTHROM AB SERPL-ACNC: 12 SEC — SIGNIFICANT CHANGE UP (ref 10.5–13.4)
RBC # BLD: 4.16 M/UL — LOW (ref 4.2–5.8)
RBC # FLD: 12.2 % — SIGNIFICANT CHANGE UP (ref 10.3–14.5)
S AUREUS DNA NOSE QL NAA+PROBE: SIGNIFICANT CHANGE UP
SODIUM SERPL-SCNC: 136 MMOL/L — SIGNIFICANT CHANGE UP (ref 135–145)
WBC # BLD: 6.36 K/UL — SIGNIFICANT CHANGE UP (ref 3.8–10.5)
WBC # FLD AUTO: 6.36 K/UL — SIGNIFICANT CHANGE UP (ref 3.8–10.5)

## 2022-09-15 PROCEDURE — 93010 ELECTROCARDIOGRAM REPORT: CPT

## 2022-09-15 PROCEDURE — 93005 ELECTROCARDIOGRAM TRACING: CPT

## 2022-09-15 PROCEDURE — G0463: CPT

## 2022-09-15 RX ORDER — RAMIPRIL 5 MG
1 CAPSULE ORAL
Qty: 0 | Refills: 0 | DISCHARGE

## 2022-09-15 RX ORDER — MUPIROCIN 20 MG/G
1 OINTMENT TOPICAL
Qty: 0 | Refills: 0 | DISCHARGE

## 2022-09-15 NOTE — H&P PST ADULT - WEIGHT IN KG
Subjective    Mr. Morrison is 66 y.o. male    Chief Complaint: Elevated PSA    History of Present Illness    Elevated PSA  Patient is here with an elevated PSA. The PSA was drawn2 week(s). He does have a family history of prostate cancer. His AUA Symptom Score is 10 /35. Voiding symptoms include Incomplete emptying, Intermittency, Urgency, Weakened stream, Straining and Nocturia. Denies Frequency. Voiding symptoms began several years  . These have been gradual in onset. none  Previous PSA values are :   No results found for: PSA       PSA- 6.400 (10/02/2021)  PSA- 0.930 (09/19/2020)    The following portions of the patient's history were reviewed and updated as appropriate: allergies, current medications, past family history, past medical history, past social history, past surgical history and problem list.    Review of Systems   Constitutional: Negative for appetite change and fever.   HENT: Negative for hearing loss and sore throat.    Eyes: Negative for pain and redness.   Respiratory: Negative for cough and shortness of breath.    Cardiovascular: Negative for chest pain and leg swelling.   Gastrointestinal: Negative for anal bleeding, nausea and vomiting.   Endocrine: Negative for cold intolerance and heat intolerance.   Genitourinary: Positive for difficulty urinating and urgency. Negative for dysuria, flank pain and hematuria.   Musculoskeletal: Negative for joint swelling and myalgias.   Skin: Negative for color change and rash.   Allergic/Immunologic: Negative for immunocompromised state.   Neurological: Negative for dizziness and speech difficulty.   Hematological: Negative for adenopathy. Does not bruise/bleed easily.   Psychiatric/Behavioral: Negative for dysphoric mood and suicidal ideas.         Current Outpatient Medications:   •  amitriptyline (ELAVIL) 25 MG tablet, Take 25 mg by mouth every night., Disp: , Rfl:   •  amLODIPine (NORVASC) 5 MG tablet, , Disp: , Rfl:   •  cyclobenzaprine (FLEXERIL) 10 MG  "tablet, Take 10 mg by mouth 3 (three) times a day as needed for muscle spasms., Disp: , Rfl:   •  diphenhydrAMINE (BENADRYL) 25 mg capsule, Take 25 mg by mouth every 6 (six) hours as needed for itching., Disp: , Rfl:   •  HYDROcodone-acetaminophen (NORCO) 7.5-325 MG per tablet, , Disp: , Rfl:   •  Lysine HCl (L-LYSINE) 500 MG tablet tablet, Take  by mouth Daily., Disp: , Rfl:   •  melatonin 5 MG tablet tablet, Take 5 mg by mouth., Disp: , Rfl:   •  temazepam (RESTORIL) 15 MG capsule, TAKE 1 CAPSULE AT BEDTIME AS NEEDED FOR INSOMNIA., Disp: , Rfl:   •  valsartan (DIOVAN) 320 MG tablet, , Disp: , Rfl:     Past Medical History:   Diagnosis Date   • History of colon resection    • Hypertension        Past Surgical History:   Procedure Laterality Date   • ANKLE SURGERY     • COLON RESECTION     • COLONOSCOPY N/A 11/21/2016    Procedure: COLONOSCOPY WITH ANESTHESIA;  Surgeon: Oscar Bermudez DO;  Location: [facility] ENDOSCOPY;  Service:    • COLONOSCOPY N/A 11/22/2019    Procedure: COLONOSCOPY WITH ANESTHESIA;  Surgeon: Oscar Bermudez DO;  Location: [facility] ENDOSCOPY;  Service: Gastroenterology       Social History     Socioeconomic History   • Marital status:    Tobacco Use   • Smoking status: Never Smoker   • Smokeless tobacco: Never Used   Vaping Use   • Vaping Use: Never used   Substance and Sexual Activity   • Alcohol use: Yes     Comment: not very often   • Drug use: No   • Sexual activity: Defer       Family History   Problem Relation Age of Onset   • Colon cancer Father    • Diverticulitis Brother        Objective    Temp 97.9 °F (36.6 °C) (Temporal)   Ht 180.3 cm (71\")   Wt 97.1 kg (214 lb)   BMI 29.85 kg/m²     Physical Exam  Genitourinary:     Comments: BÁRBARA 40 gm smooth no nodules              Results for orders placed or performed in visit on 10/18/21   POC Urinalysis Dipstick, Multipro    Specimen: Urine   Result Value Ref Range    Color Yellow Yellow, Straw, Dark Yellow, Casandra    Clarity, UA " Clear Clear    Glucose, UA Negative Negative, 1000 mg/dL (3+) mg/dL    Bilirubin Negative Negative    Ketones, UA Trace (A) Negative    Specific Gravity  1.015 1.005 - 1.030    Blood, UA Negative Negative    pH, Urine 6.0 5.0 - 8.0    Protein, POC Trace (A) Negative mg/dL    Urobilinogen, UA Normal (A) Normal    Nitrite, UA Negative Negative    Leukocytes Negative Negative     Assessment and Plan    Diagnoses and all orders for this visit:    1. Elevated prostate specific antigen (PSA) (Primary)  -     POC Urinalysis Dipstick, Multipro    2. BPH with urinary obstruction    3. Family history of prostate cancer          Patient with significant family history of prostate cancer 2 brothers diagnosed with it.  His PSA jumped from less than 1 to 6.  BÁRBARA is normal.  We will plan for MRI to assess.  He may need MRI fusion biopsy.       89.4

## 2022-09-15 NOTE — H&P PST ADULT - NSICDXFAMILYHX_GEN_ALL_CORE_FT
FAMILY HISTORY:  Father  Still living? Unknown  FH: heart disease, Age at diagnosis: Age Unknown    Mother  Still living? No  Family history of brain cancer, Age at diagnosis: Age Unknown

## 2022-09-15 NOTE — H&P PST ADULT - MUSCULOSKELETAL
details… Limited ROM with neck extension/normal/no joint swelling/no joint erythema/no joint warmth/normal gait/strength 5/5 bilateral upper extremities/strength 5/5 bilateral lower extremities

## 2022-09-15 NOTE — H&P PST ADULT - NSICDXPASTMEDICALHX_GEN_ALL_CORE_FT
PAST MEDICAL HISTORY:  At risk for sleep apnea BANG 5    CAD (coronary artery disease) CABG x3- 2015    HTN (hypertension)     Hyperlipemia      PAST MEDICAL HISTORY:  At risk for sleep apnea BANG 5    CAD (coronary artery disease) CABG x3- 2015    Cervical spondylosis     HTN (hypertension)     Hyperlipemia

## 2022-09-15 NOTE — H&P PST ADULT - CARDIOVASCULAR COMMENTS
HTN, Hyperlipidemia, CAD- CABG x3 2015 stable follows with cardio Dr Sanchez regularly- clearance for surgery

## 2022-09-15 NOTE — H&P PST ADULT - PROBLEM SELECTOR PLAN 2
72 y/o female with PMH of CAD, Afib s/p pacemaker 2020 HLD, HTN, basal cell carcinoma, intraductal papillary mucinous neoplasm, lung ca s/p resection of left lung 2019 scheduled for watchman/MICHAEL with Dr. Rika Gong 9/19/2022.

## 2022-09-15 NOTE — H&P PST ADULT - NSICDXPASTSURGICALHX_GEN_ALL_CORE_FT
PAST SURGICAL HISTORY:  Achilles tendon injury Right torn achilles- age 16    Carpal tunnel syndrome of right wrist 2016    H/O colonoscopy 2016    History of open heart surgery CABG x3- 2015

## 2022-09-15 NOTE — H&P PST ADULT - PROBLEM SELECTOR PLAN 1
72 y/o female with PMH of CAD, Afib s/p pacemaker 2020 HLD, HTN, basal cell carcinoma, intraductal papillary mucinous neoplasm, lung ca s/p resection of left lung 2019 scheduled for watchman/MICHAEL with Dr. Rika Gong 9/19/2022.    - Medical evaluation pending   -Cardiac evaluation pending  -Patient educated on ERP protocol (written/verbal)- verbalized understanding  -Educated on NSAIDS, multivitamins and herbals that increase the risk of bleeding and need to be stopped 7 days before procedure  -Educated on infection prevention  -Covid testing 3-5 days prior to surgery   -Tylenol can be taken 1 week before surgery if needed for pain  -ASA will be held as per cardiologist's instruction  -Verbalized understanding of all instructions

## 2022-09-15 NOTE — H&P PST ADULT - ATTENDING COMMENTS
pt presents for 4 level ACDF secondary to severe spondylosis, stenosis, radiculopathy as well as intractable pain & poor quality of life. Pt aware of dysphagia, dysphonia, adjacent level dz, revision surgery and incomplete resolution of s/sx    I reviewed the patient's imaging before the case. I confirmed with the patient the planned surgical site and side of procedure. I confirmed that the appropriate imaging demonstrating the surgical side and site was available and present for the planned case. The present imaging confirmed the surgical indication for the planned surgery. Secondary provider reviewing the imaging was JUANY [place]

## 2022-09-15 NOTE — H&P PST ADULT - FALL HARM RISK - PT AGE POPULATION HIDDEN
Diffuse bilateral peribronchial airspace consolidation may reflect multilobar bronchopneumonia. Notable labs include Blood cx NGTD, HIV, legionella -, crypto ag -, P-ANCA positive1:1280, IVAN positive 1:640, C3 76, C4 10, elevated CRP and ESR. RMSF IgM positive. Concern for underlying rheumatalogic/autoimmune disease process. Pulmonary renal syndrome vs SLE vs Drug induced SLE vs lymphangitic spread of ?lymphoma  - c/w solumedrol 500mg IV q12h for now  - will attempt to set up bronchoscopy/BAL for Friday although unclear yield given marked response to steroid treatment  - f/u UA  - please check anti-histone antibody  - Titrate down supplemental O2 as tolerated    Clovis Thomason MD  Pulmonary & Critical Care Fellow  [phone] Adult

## 2022-09-15 NOTE — H&P PST ADULT - HISTORY OF PRESENT ILLNESS
Patient presents for surgical conversation with regard to cervical spondylosis and cervical myelopathy patient again states he does Endor is getting progressively worse he notices that he is off balance dexterity is suffering and we have discussed the concept of preventative measures with regard to decompression improves and preventing further/progressive myelopathy and subsequent disability     He states the symptoms are worsening. The patient mentions the symptoms started 6+ month(s) ago. Pain levels include a current pain level of 1/10 and a maximum pain level of 10/10.   He states the symptoms seem to be intermittent.     Modifying factors - worsened by bending and worsened by lifting . Cervical extension. Relieving factors include relieved by nonsteroidal anti-inflammatory drugs and relieved by rest.   Associated Symptoms: no ataxia, no incontinence, good dexterity and no urinary retention.      66 y/o male with PMH CAD, presents with c/o posterior neck pain. Reports neck pain for about 7 years worsening the pastmomths. Radiates down to right fingertips. C/o numbness and tingling to right arm arm to fingertips. Described as sharp, burning and ache. Denies receivig any previous injections/PT. Tylenol PRN with minimal relief. Currentl pain level 5/10 and worse pain he's had 7/10. Pain is worse with sittingup at the counter top. Laying down alleviate pain. No changes in bowel and bladder    Patient presents for surgical conversation with regard to cervical spondylosis and cervical myelopathy patient again states he does Endor is getting progressively worse he notices that he is off balance dexterity is suffering and we have discussed the concept of preventative measures with regard to decompression improves and preventing further/progressive myelopathy and subsequent disability     He states the symptoms are worsening. The patient mentions the symptoms started 6+ month(s) ago. Pain levels include a current pain level of 1/10 and a maximum pain level of 10/10.   He states the symptoms seem to be intermittent.     Modifying factors - worsened by bending and worsened by lifting . Cervical extension. Relieving factors include relieved by nonsteroidal anti-inflammatory drugs and relieved by rest.   Associated Symptoms: no ataxia, no incontinence, good dexterity and no urinary retention.      66 y/o male with PMH CAD s/p CABGx3 2015, HTN, HLD presents with c/o posterior neck pain. Reports neck pain for about 7 years and worsening the past few months. Pain radiates down to right arm and right fingertips. C/o numbness and tingling to right arm to fingertips. Reports carpal tunnel to right wrist s/p surgery but continued to have numbness/tingling to the first 3 fingers. Described pain as sharp, burning and ache. Denies receiving any previous injections to cervical area. Reports taking tylenol PRN with minimal relief. Currently pain level 5/10 and the worse pain he's had is 7/10. Pain is worse with sitting-up at the counter top at home. Laying down alleviate pain. No changes in bowel and bladder. Scheduled for Anterior cervical discectomy fusion C3-4 C6-7 with Dr. Matthews on 10/5/2022.

## 2022-09-21 ENCOUNTER — APPOINTMENT (OUTPATIENT)
Dept: FAMILY MEDICINE | Facility: CLINIC | Age: 67
End: 2022-09-21

## 2022-09-21 VITALS
SYSTOLIC BLOOD PRESSURE: 138 MMHG | BODY MASS INDEX: 31.65 KG/M2 | HEIGHT: 65 IN | WEIGHT: 190 LBS | DIASTOLIC BLOOD PRESSURE: 82 MMHG | HEART RATE: 68 BPM | TEMPERATURE: 98.1 F | OXYGEN SATURATION: 98 %

## 2022-09-21 DIAGNOSIS — Z13.228 ENCOUNTER FOR SCREENING FOR OTHER SUSPECTED ENDOCRINE DISORDER: ICD-10-CM

## 2022-09-21 DIAGNOSIS — Z87.898 PERSONAL HISTORY OF OTHER SPECIFIED CONDITIONS: ICD-10-CM

## 2022-09-21 DIAGNOSIS — Z01.818 ENCOUNTER FOR OTHER PREPROCEDURAL EXAMINATION: ICD-10-CM

## 2022-09-21 DIAGNOSIS — Z13.220 ENCOUNTER FOR SCREENING FOR LIPOID DISORDERS: ICD-10-CM

## 2022-09-21 DIAGNOSIS — M47.812 SPONDYLOSIS W/OUT MYELOPATHY OR RADICULOPATHY, CERVICAL REGION: ICD-10-CM

## 2022-09-21 DIAGNOSIS — Z13.6 ENCOUNTER FOR SCREENING FOR CARDIOVASCULAR DISORDERS: ICD-10-CM

## 2022-09-21 DIAGNOSIS — R06.09 OTHER FORMS OF DYSPNEA: ICD-10-CM

## 2022-09-21 DIAGNOSIS — Z13.0 ENCOUNTER FOR SCREENING FOR OTHER SUSPECTED ENDOCRINE DISORDER: ICD-10-CM

## 2022-09-21 DIAGNOSIS — Z13.29 ENCOUNTER FOR SCREENING FOR OTHER SUSPECTED ENDOCRINE DISORDER: ICD-10-CM

## 2022-09-21 DIAGNOSIS — M27.3 ALVEOLITIS OF JAWS: ICD-10-CM

## 2022-09-21 DIAGNOSIS — I24.9 ACUTE ISCHEMIC HEART DISEASE, UNSPECIFIED: ICD-10-CM

## 2022-09-21 PROCEDURE — 99214 OFFICE O/P EST MOD 30 MIN: CPT

## 2022-09-21 RX ORDER — PREDNISONE 5 MG/1
5 TABLET ORAL TWICE DAILY
Qty: 14 | Refills: 0 | Status: DISCONTINUED | COMMUNITY
Start: 2021-10-22 | End: 2022-09-21

## 2022-09-21 NOTE — ASSESSMENT
[Patient Optimized for Surgery] : Patient optimized for surgery [Modify anti-platelet treatment prior to procedure] : Modify anti-platelet treatment prior to procedure [FreeTextEntry4] : Patient will hold aspirin for 7 days prior to procedure [FreeTextEntry6] : Hold aspirin

## 2022-09-21 NOTE — HISTORY OF PRESENT ILLNESS
[Coronary Artery Disease] : coronary artery disease [No Pertinent Pulmonary History] : no history of asthma, COPD, sleep apnea, or smoking [No Adverse Anesthesia Reaction] : no adverse anesthesia reaction in self or family member [Chronic Anticoagulation] : no chronic anticoagulation [Chronic Kidney Disease] : no chronic kidney disease [Diabetes] : no diabetes [(Patient denies any chest pain, claudication, dyspnea on exertion, orthopnea, palpitations or syncope)] : Patient denies any chest pain, claudication, dyspnea on exertion, orthopnea, palpitations or syncope [FreeTextEntry1] : Cervical laminectomy [FreeTextEntry2] : October 5 [FreeTextEntry3] : Dr. Matthews [FreeTextEntry4] : Patient has chronic shoulder and right arm pain due to herniated disks in the cervical spine to have laminectomy\par \par Status post CABG 2015 patient walks 3 to 4 miles a day briskly without chest pain will has been cleared by cardiology for this procedure

## 2022-09-23 DIAGNOSIS — Z78.9 OTHER SPECIFIED HEALTH STATUS: ICD-10-CM

## 2022-10-04 ENCOUNTER — TRANSCRIPTION ENCOUNTER (OUTPATIENT)
Age: 67
End: 2022-10-04

## 2022-10-04 NOTE — ASU PATIENT PROFILE, ADULT - NSICDXPASTMEDICALHX_GEN_ALL_CORE_FT
PAST MEDICAL HISTORY:  At risk for sleep apnea BANG 5    CAD (coronary artery disease) CABG x3- 2015    Cervical spondylosis     HTN (hypertension)     Hyperlipemia

## 2022-10-05 ENCOUNTER — TRANSCRIPTION ENCOUNTER (OUTPATIENT)
Age: 67
End: 2022-10-05

## 2022-10-05 ENCOUNTER — APPOINTMENT (OUTPATIENT)
Dept: ORTHOPEDIC SURGERY | Facility: HOSPITAL | Age: 67
End: 2022-10-05

## 2022-10-05 ENCOUNTER — INPATIENT (INPATIENT)
Facility: HOSPITAL | Age: 67
LOS: 1 days | Discharge: HOME | DRG: 472 | End: 2022-10-07
Attending: ORTHOPAEDIC SURGERY | Admitting: ORTHOPAEDIC SURGERY
Payer: MEDICARE

## 2022-10-05 ENCOUNTER — NON-APPOINTMENT (OUTPATIENT)
Age: 67
End: 2022-10-05

## 2022-10-05 VITALS
TEMPERATURE: 98 F | WEIGHT: 190.04 LBS | SYSTOLIC BLOOD PRESSURE: 133 MMHG | RESPIRATION RATE: 16 BRPM | DIASTOLIC BLOOD PRESSURE: 71 MMHG | HEART RATE: 63 BPM | OXYGEN SATURATION: 100 % | HEIGHT: 65 IN

## 2022-10-05 DIAGNOSIS — Z98.890 OTHER SPECIFIED POSTPROCEDURAL STATES: Chronic | ICD-10-CM

## 2022-10-05 DIAGNOSIS — G56.01 CARPAL TUNNEL SYNDROME, RIGHT UPPER LIMB: Chronic | ICD-10-CM

## 2022-10-05 DIAGNOSIS — M54.12 RADICULOPATHY, CERVICAL REGION: ICD-10-CM

## 2022-10-05 DIAGNOSIS — S86.009A UNSPECIFIED INJURY OF UNSPECIFIED ACHILLES TENDON, INITIAL ENCOUNTER: Chronic | ICD-10-CM

## 2022-10-05 LAB
BLD GP AB SCN SERPL QL: SIGNIFICANT CHANGE UP
GLUCOSE BLDC GLUCOMTR-MCNC: 118 MG/DL — HIGH (ref 70–99)
GLUCOSE BLDC GLUCOMTR-MCNC: 99 MG/DL — SIGNIFICANT CHANGE UP (ref 70–99)

## 2022-10-05 PROCEDURE — 22551 ARTHRD ANT NTRBDY CERVICAL: CPT

## 2022-10-05 PROCEDURE — 22552 ARTHRD ANT NTRBD CERVICAL EA: CPT | Mod: AS

## 2022-10-05 PROCEDURE — 22853 INSJ BIOMECHANICAL DEVICE: CPT

## 2022-10-05 PROCEDURE — 22552 ARTHRD ANT NTRBD CERVICAL EA: CPT

## 2022-10-05 PROCEDURE — 22846 INSERT SPINE FIXATION DEVICE: CPT | Mod: AS,59

## 2022-10-05 PROCEDURE — 22551 ARTHRD ANT NTRBDY CERVICAL: CPT | Mod: AS

## 2022-10-05 PROCEDURE — 22853 INSJ BIOMECHANICAL DEVICE: CPT | Mod: AS

## 2022-10-05 PROCEDURE — 22846 INSERT SPINE FIXATION DEVICE: CPT | Mod: 59

## 2022-10-05 DEVICE — SPONGE GELFOAM SZ 100 UNCOMPRESSED: Type: IMPLANTABLE DEVICE | Status: FUNCTIONAL

## 2022-10-05 DEVICE — SCREW VAR SELF START 4.35X18MM: Type: IMPLANTABLE DEVICE | Status: FUNCTIONAL

## 2022-10-05 DEVICE — SURGIFOAM PAD SZ 100: Type: IMPLANTABLE DEVICE | Status: FUNCTIONAL

## 2022-10-05 DEVICE — IMPLANTABLE DEVICE: Type: IMPLANTABLE DEVICE | Status: FUNCTIONAL

## 2022-10-05 DEVICE — PIN CAP ACDF TAPERED: Type: IMPLANTABLE DEVICE | Status: FUNCTIONAL

## 2022-10-05 DEVICE — PIN ACDF 60 MM: Type: IMPLANTABLE DEVICE | Status: FUNCTIONAL

## 2022-10-05 DEVICE — OSTEOSELECT DBM PLUS 5CC: Type: IMPLANTABLE DEVICE | Status: FUNCTIONAL

## 2022-10-05 DEVICE — SEALANT FLOSEAL FAST PREP HEMOSTATIC MATRIX 10ML: Type: IMPLANTABLE DEVICE | Status: FUNCTIONAL

## 2022-10-05 RX ORDER — L.ACIDOPH/B.ANIMALIS/B.LONGUM 15B CELL
1 CAPSULE ORAL
Qty: 0 | Refills: 0 | DISCHARGE

## 2022-10-05 RX ORDER — ATORVASTATIN CALCIUM 80 MG/1
40 TABLET, FILM COATED ORAL AT BEDTIME
Refills: 0 | Status: DISCONTINUED | OUTPATIENT
Start: 2022-10-05 | End: 2022-10-07

## 2022-10-05 RX ORDER — RANOLAZINE 500 MG/1
1 TABLET, FILM COATED, EXTENDED RELEASE ORAL
Qty: 0 | Refills: 0 | DISCHARGE

## 2022-10-05 RX ORDER — ONDANSETRON 8 MG/1
4 TABLET, FILM COATED ORAL ONCE
Refills: 0 | Status: DISCONTINUED | OUTPATIENT
Start: 2022-10-05 | End: 2022-10-05

## 2022-10-05 RX ORDER — HYDROMORPHONE HYDROCHLORIDE 2 MG/ML
1 INJECTION INTRAMUSCULAR; INTRAVENOUS; SUBCUTANEOUS ONCE
Refills: 0 | Status: DISCONTINUED | OUTPATIENT
Start: 2022-10-05 | End: 2022-10-05

## 2022-10-05 RX ORDER — PANTOPRAZOLE SODIUM 20 MG/1
0 TABLET, DELAYED RELEASE ORAL
Qty: 0 | Refills: 0 | DISCHARGE

## 2022-10-05 RX ORDER — ISOSORBIDE MONONITRATE 60 MG/1
60 TABLET, EXTENDED RELEASE ORAL DAILY
Refills: 0 | Status: DISCONTINUED | OUTPATIENT
Start: 2022-10-05 | End: 2022-10-07

## 2022-10-05 RX ORDER — DIAZEPAM 5 MG
2 TABLET ORAL ONCE
Refills: 0 | Status: DISCONTINUED | OUTPATIENT
Start: 2022-10-05 | End: 2022-10-05

## 2022-10-05 RX ORDER — KETOROLAC TROMETHAMINE 30 MG/ML
30 SYRINGE (ML) INJECTION ONCE
Refills: 0 | Status: DISCONTINUED | OUTPATIENT
Start: 2022-10-05 | End: 2022-10-05

## 2022-10-05 RX ORDER — CELECOXIB 200 MG/1
200 CAPSULE ORAL EVERY 12 HOURS
Refills: 0 | Status: DISCONTINUED | OUTPATIENT
Start: 2022-10-05 | End: 2022-10-07

## 2022-10-05 RX ORDER — ACETAMINOPHEN 500 MG
975 TABLET ORAL ONCE
Refills: 0 | Status: COMPLETED | OUTPATIENT
Start: 2022-10-05 | End: 2022-10-05

## 2022-10-05 RX ORDER — CEFAZOLIN SODIUM 1 G
2000 VIAL (EA) INJECTION
Refills: 0 | Status: COMPLETED | OUTPATIENT
Start: 2022-10-05 | End: 2022-10-06

## 2022-10-05 RX ORDER — FENTANYL CITRATE 50 UG/ML
50 INJECTION INTRAVENOUS
Refills: 0 | Status: DISCONTINUED | OUTPATIENT
Start: 2022-10-05 | End: 2022-10-05

## 2022-10-05 RX ORDER — SODIUM CHLORIDE 9 MG/ML
1000 INJECTION, SOLUTION INTRAVENOUS
Refills: 0 | Status: DISCONTINUED | OUTPATIENT
Start: 2022-10-05 | End: 2022-10-05

## 2022-10-05 RX ORDER — ACETAMINOPHEN 500 MG
1000 TABLET ORAL ONCE
Refills: 0 | Status: COMPLETED | OUTPATIENT
Start: 2022-10-05 | End: 2022-10-05

## 2022-10-05 RX ORDER — ONDANSETRON 8 MG/1
4 TABLET, FILM COATED ORAL EVERY 6 HOURS
Refills: 0 | Status: DISCONTINUED | OUTPATIENT
Start: 2022-10-05 | End: 2022-10-07

## 2022-10-05 RX ORDER — BENZOCAINE AND MENTHOL 5; 1 G/100ML; G/100ML
1 LIQUID ORAL
Refills: 0 | Status: DISCONTINUED | OUTPATIENT
Start: 2022-10-05 | End: 2022-10-07

## 2022-10-05 RX ORDER — RANOLAZINE 500 MG/1
500 TABLET, FILM COATED, EXTENDED RELEASE ORAL
Refills: 0 | Status: DISCONTINUED | OUTPATIENT
Start: 2022-10-05 | End: 2022-10-07

## 2022-10-05 RX ORDER — ASPIRIN/CALCIUM CARB/MAGNESIUM 324 MG
325 TABLET ORAL DAILY
Refills: 0 | Status: DISCONTINUED | OUTPATIENT
Start: 2022-10-06 | End: 2022-10-07

## 2022-10-05 RX ORDER — LISINOPRIL 2.5 MG/1
10 TABLET ORAL DAILY
Refills: 0 | Status: DISCONTINUED | OUTPATIENT
Start: 2022-10-06 | End: 2022-10-07

## 2022-10-05 RX ORDER — RAMIPRIL 5 MG
1 CAPSULE ORAL
Qty: 0 | Refills: 0 | DISCHARGE

## 2022-10-05 RX ORDER — METOPROLOL TARTRATE 50 MG
25 TABLET ORAL DAILY
Refills: 0 | Status: DISCONTINUED | OUTPATIENT
Start: 2022-10-05 | End: 2022-10-07

## 2022-10-05 RX ORDER — CELECOXIB 200 MG/1
200 CAPSULE ORAL ONCE
Refills: 0 | Status: COMPLETED | OUTPATIENT
Start: 2022-10-05 | End: 2022-10-05

## 2022-10-05 RX ORDER — ISOSORBIDE MONONITRATE 60 MG/1
1 TABLET, EXTENDED RELEASE ORAL
Qty: 0 | Refills: 0 | DISCHARGE

## 2022-10-05 RX ORDER — SENNA PLUS 8.6 MG/1
2 TABLET ORAL AT BEDTIME
Refills: 0 | Status: DISCONTINUED | OUTPATIENT
Start: 2022-10-05 | End: 2022-10-07

## 2022-10-05 RX ORDER — CEFAZOLIN SODIUM 1 G
2000 VIAL (EA) INJECTION ONCE
Refills: 0 | Status: COMPLETED | OUTPATIENT
Start: 2022-10-05 | End: 2022-10-05

## 2022-10-05 RX ORDER — METOPROLOL TARTRATE 50 MG
1 TABLET ORAL
Qty: 0 | Refills: 0 | DISCHARGE

## 2022-10-05 RX ORDER — SODIUM CHLORIDE 9 MG/ML
1000 INJECTION, SOLUTION INTRAVENOUS
Refills: 0 | Status: DISCONTINUED | OUTPATIENT
Start: 2022-10-05 | End: 2022-10-07

## 2022-10-05 RX ORDER — PANTOPRAZOLE SODIUM 20 MG/1
40 TABLET, DELAYED RELEASE ORAL
Refills: 0 | Status: DISCONTINUED | OUTPATIENT
Start: 2022-10-05 | End: 2022-10-07

## 2022-10-05 RX ORDER — METHOCARBAMOL 500 MG/1
750 TABLET, FILM COATED ORAL EVERY 8 HOURS
Refills: 0 | Status: DISCONTINUED | OUTPATIENT
Start: 2022-10-05 | End: 2022-10-07

## 2022-10-05 RX ORDER — OXYCODONE HYDROCHLORIDE 5 MG/1
5 TABLET ORAL
Refills: 0 | Status: DISCONTINUED | OUTPATIENT
Start: 2022-10-05 | End: 2022-10-07

## 2022-10-05 RX ORDER — ACETAMINOPHEN 500 MG
1000 TABLET ORAL EVERY 8 HOURS
Refills: 0 | Status: DISCONTINUED | OUTPATIENT
Start: 2022-10-05 | End: 2022-10-06

## 2022-10-05 RX ORDER — MAGNESIUM HYDROXIDE 400 MG/1
30 TABLET, CHEWABLE ORAL EVERY 12 HOURS
Refills: 0 | Status: DISCONTINUED | OUTPATIENT
Start: 2022-10-05 | End: 2022-10-07

## 2022-10-05 RX ORDER — SODIUM CHLORIDE 9 MG/ML
3 INJECTION INTRAMUSCULAR; INTRAVENOUS; SUBCUTANEOUS EVERY 8 HOURS
Refills: 0 | Status: DISCONTINUED | OUTPATIENT
Start: 2022-10-05 | End: 2022-10-05

## 2022-10-05 RX ORDER — OXYCODONE HYDROCHLORIDE 5 MG/1
10 TABLET ORAL
Refills: 0 | Status: DISCONTINUED | OUTPATIENT
Start: 2022-10-05 | End: 2022-10-07

## 2022-10-05 RX ORDER — APREPITANT 80 MG/1
40 CAPSULE ORAL ONCE
Refills: 0 | Status: COMPLETED | OUTPATIENT
Start: 2022-10-05 | End: 2022-10-05

## 2022-10-05 RX ADMIN — OXYCODONE HYDROCHLORIDE 10 MILLIGRAM(S): 5 TABLET ORAL at 23:30

## 2022-10-05 RX ADMIN — Medication 100 MILLIGRAM(S): at 13:26

## 2022-10-05 RX ADMIN — SODIUM CHLORIDE 80 MILLILITER(S): 9 INJECTION, SOLUTION INTRAVENOUS at 21:01

## 2022-10-05 RX ADMIN — HYDROMORPHONE HYDROCHLORIDE 1 MILLIGRAM(S): 2 INJECTION INTRAMUSCULAR; INTRAVENOUS; SUBCUTANEOUS at 18:47

## 2022-10-05 RX ADMIN — OXYCODONE HYDROCHLORIDE 10 MILLIGRAM(S): 5 TABLET ORAL at 22:51

## 2022-10-05 RX ADMIN — Medication 975 MILLIGRAM(S): at 11:59

## 2022-10-05 RX ADMIN — Medication 100 MILLIGRAM(S): at 21:01

## 2022-10-05 RX ADMIN — HYDROMORPHONE HYDROCHLORIDE 1 MILLIGRAM(S): 2 INJECTION INTRAMUSCULAR; INTRAVENOUS; SUBCUTANEOUS at 19:29

## 2022-10-05 RX ADMIN — CELECOXIB 200 MILLIGRAM(S): 200 CAPSULE ORAL at 11:30

## 2022-10-05 RX ADMIN — METHOCARBAMOL 750 MILLIGRAM(S): 500 TABLET, FILM COATED ORAL at 22:39

## 2022-10-05 RX ADMIN — ATORVASTATIN CALCIUM 40 MILLIGRAM(S): 80 TABLET, FILM COATED ORAL at 22:39

## 2022-10-05 RX ADMIN — APREPITANT 40 MILLIGRAM(S): 80 CAPSULE ORAL at 11:29

## 2022-10-05 RX ADMIN — Medication 30 MILLIGRAM(S): at 18:46

## 2022-10-05 RX ADMIN — FENTANYL CITRATE 50 MICROGRAM(S): 50 INJECTION INTRAVENOUS at 17:50

## 2022-10-05 RX ADMIN — Medication 2 MILLIGRAM(S): at 18:02

## 2022-10-05 RX ADMIN — Medication 1000 MILLIGRAM(S): at 22:30

## 2022-10-05 RX ADMIN — CELECOXIB 200 MILLIGRAM(S): 200 CAPSULE ORAL at 12:00

## 2022-10-05 RX ADMIN — Medication 400 MILLIGRAM(S): at 18:46

## 2022-10-05 RX ADMIN — Medication 975 MILLIGRAM(S): at 11:29

## 2022-10-05 RX ADMIN — SENNA PLUS 2 TABLET(S): 8.6 TABLET ORAL at 22:38

## 2022-10-05 RX ADMIN — Medication 30 MILLIGRAM(S): at 19:29

## 2022-10-05 RX ADMIN — Medication 1000 MILLIGRAM(S): at 23:00

## 2022-10-05 RX ADMIN — FENTANYL CITRATE 50 MICROGRAM(S): 50 INJECTION INTRAVENOUS at 19:29

## 2022-10-05 RX ADMIN — Medication 1000 MILLIGRAM(S): at 19:29

## 2022-10-05 NOTE — BRIEF OPERATIVE NOTE - TYPE OF ANESTHESIA
Discharge instructions given and pt verbalized understanding. Gait steady. No distress noted.      Amberly Carter RN  07/03/21 [id number] General

## 2022-10-05 NOTE — DISCHARGE NOTE PROVIDER - NSDCCPCAREPLAN_GEN_ALL_CORE_FT
PRINCIPAL DISCHARGE DIAGNOSIS  Diagnosis: Spondylosis without myelopathy or radiculopathy, cervical region  Assessment and Plan of Treatment:

## 2022-10-05 NOTE — BRIEF OPERATIVE NOTE - NSICDXBRIEFPREOP_GEN_ALL_CORE_FT
PRE-OP DIAGNOSIS:  Cervical spondylosis with radiculopathy 05-Oct-2022 11:29:07  Wilner Matthews  
PRE-OP DIAGNOSIS:  Cervical spondylosis with radiculopathy 05-Oct-2022 11:29:07  Wilner Matthews

## 2022-10-05 NOTE — DISCHARGE NOTE PROVIDER - CARE PROVIDER_API CALL
Wilner Matthews (DO)  Orthopaedic Surgery  [address] [place] [address], [address]  [place] [address]  Phone: [phone]  Fax: [phone]  Follow Up Time:

## 2022-10-05 NOTE — DISCHARGE NOTE PROVIDER - HOSPITAL COURSE
patient was admitted for elective anterior cervical discectomy and fusion. The post operative course was uneventful.  PT/OT Worked with patient for acute rehabilitation process. The pain was adequately controlled and patient is able to go home as she can accomplish ADL with help from family member at this time.  The cervical collar was worn for comfort when out of bed. Patient was admitted for elective anterior cervical discectomy and fusion. The post operative course was uneventful.  PT/OT Worked with patient for acute rehabilitation process. The pain was adequately controlled and patient is able to go home as she can accomplish ADL with help from family member at this time.  The cervical collar was worn for comfort when out of bed.

## 2022-10-05 NOTE — DISCHARGE NOTE PROVIDER - NSDCMRMEDTOKEN_GEN_ALL_CORE_FT
atorvastatin 40 mg oral tablet: 1 tab(s) orally once a day (at bedtime)  Abby Aspirin: 81 milligram(s) orally once a day  isosorbide mononitrate 60 mg oral tablet, extended release: 1 tab(s) orally once a day (in the morning)  metoprolol succinate 25 mg oral tablet, extended release: 1 tab(s) orally once a day  Multiple Vitamins oral tablet: 1 tab(s) orally once a day  pantoprazole 40 mg oral delayed release tablet: orally every other day  Probiotic Formula oral capsule: 1 cap(s) orally once a day  ramipril 2.5 mg oral capsule: 1 cap(s) orally once a day  ranolazine 500 mg oral tablet, extended release: 1 tab(s) orally 2 times a day   Aspirin Enteric Coated 325 mg oral delayed release tablet: 1 tab(s) orally once a day . Take with food.   atorvastatin 40 mg oral tablet: 1 tab(s) orally once a day (at bedtime)  Abby Aspirin: 81 milligram(s) orally once a day starting 29 days after surgery.   isosorbide mononitrate 60 mg oral tablet, extended release: 1 tab(s) orally once a day (in the morning)  menthol-benzocaine 3.6 mg-15 mg mucous membrane lozenge: 1 lozenge mucous membrane every 3 hours, As Needed for sore throat.   methocarbamol 750 mg oral tablet: 1 tab(s) orally every 8 hours, As Needed for muscle spasms.   metoprolol succinate 25 mg oral tablet, extended release: 1 tab(s) orally once a day  Multiple Vitamins oral tablet: 1 tab(s) orally once a day  oxyCODONE 5 mg oral tablet: 1 tab(s) orally every 6 hours, As Needed  for severe pain. MDD:4  pantoprazole 40 mg oral delayed release tablet: orally every other day  Probiotic Formula oral capsule: 1 cap(s) orally once a day  ramipril 2.5 mg oral capsule: 1 cap(s) orally once a day  ranolazine 500 mg oral tablet, extended release: 1 tab(s) orally 2 times a day  Senna S 50 mg-8.6 mg oral tablet: 2 tab(s) orally once a day (at bedtime) .   Aspirin Enteric Coated 325 mg oral delayed release tablet: 1 tab(s) orally once a day . Take with food.   atorvastatin 40 mg oral tablet: 1 tab(s) orally once a day (at bedtime)  Abby Aspirin: 81 milligram(s) orally once a day starting 29 days after surgery.   isosorbide mononitrate 60 mg oral tablet, extended release: 1 tab(s) orally once a day (in the morning)  menthol-benzocaine 3.6 mg-15 mg mucous membrane lozenge: 1 lozenge mucous membrane every 3 hours, As Needed for sore throat.   methocarbamol 750 mg oral tablet: 1 tab(s) orally every 8 hours, As Needed for muscle spasms.   metoprolol succinate 25 mg oral tablet, extended release: 1 tab(s) orally once a day  Multiple Vitamins oral tablet: 1 tab(s) orally once a day  oxyCODONE 5 mg oral tablet: 1 tab(s) orally every 6 hours, As Needed  for severe pain. MDD:4  pantoprazole 40 mg oral delayed release tablet: orally every other day  Probiotic Formula oral capsule: 1 cap(s) orally once a day  ramipril 2.5 mg oral capsule: 1 cap(s) orally once a day  ranolazine 500 mg oral tablet, extended release: 1 tab(s) orally 2 times a day  Senna S 50 mg-8.6 mg oral tablet: 2 tab(s) orally once a day (at bedtime) .  tamsulosin 0.4 mg oral capsule: 1 cap(s) orally once a day (at bedtime)

## 2022-10-05 NOTE — BRIEF OPERATIVE NOTE - ANTIBIOTIC PROTOCOL
Patient:   HIRAM BENNETT            MRN: [id number]            FIN: [id number]              Age:   23 years     Sex:  FEMALE     :  [birth year]   Associated Diagnoses:   None   Author:   VIET REED     OB Admission H&P  Prenatal Care Provider: Dr. Kareem PRATER 39 weeks 1 day  *NOTE DELAYED: History obtained when patient in labor, H&P not previously completed when patient admitted through triage*  HPI:  Ms. Coronel is a 23 year old  at 39 weeks 1 day admitted in labor. She reports SROM at 0330 on 5/3. She presented to triage with SROM and was 5 cm on admission to triage, amari painfully.   ObHx:   FT  x 1  GynHx: Denies abnormal pap smears or STDs.   PMHx: none  PSHx: none  Meds: prenatal vitamins  Allx: NKDA  FamHx: non contributory  Soc: Denies tobacco, alcohol, or illicit drug use.  Objective:  Physical Exam:  Vitals between:   03-MAY-2020 03:52:45   TO   04-MAY-2020 03:52:45                   LAST RESULT MINIMUM MAXIMUM  Temperature 36.8 36.7 36.8  Heart Rate 77 70 87  Respiratory Rate 18 18 18  NISBP           88 88 124  NIDBP           50 48 83  NIMBP           63 63 89  SpO2                    100 99 100  Heart:  RRR, S1/S2  Lungs: CTAB, symmetric expansion, no distress  Abdomen: gravid, soft, no fundal tenderness, no RUQ tenderness, no suprapubic tenderness  Extremities: trace edema, no calf tenderness  Neuro/Reflexes: A&Ox3  SVE: 5cm-->7 cm-->now 10/100/0  Membranes: ruptured on 5/3 at 0300 per patient report  EFM:  140/mod julian/+accels/-decels  Limited Bedside Ultrasound:  cephalic  ______________________  Assessment / Diagnosis:  Ms. Coronel is a 23 year old  at 39 weeks 1 day admitted in labor.  Plan:  GBS negative  Rh positive  Expectant management  Now s/p epidural  Dr. Serna being contacted right now due to full dilation  Viet Hernandez MD PGY3  
Followed protocol
Followed protocol

## 2022-10-05 NOTE — DISCHARGE NOTE PROVIDER - NSDCFUADDINST_GEN_ALL_CORE_FT
Leave occulsive dressing on wound for one week. You may then remove it and leave open to air. Protect while showering.  Leave steri strips intact, they will fall off on their own or be removed on first post op visit. Wear cervical collar when out of bed for comfort, especially when you are passenger in a car , may take off for meals & showering.  Physical therapy will be prescribed on second office visit.  For now, engage in light activity as tolerated, no lifting greater than 5 lb.  No driving while on pain meds and must wear cervical collar when in a vehicle for 28 days.  Use the Robaxin for neck spasms as needed. Patient will take ASPIRIN 325mg once daily for 28 days post-operatively for blood clot prevention. Leave occlusive dressing on wound for one week. You may then remove it and leave open to air. Protect while showering.  Leave steri strips intact, they will fall off on their own or be removed on first post op visit. Wear cervical collar when out of bed for comfort, especially when you are passenger in a car , may take off for meals & showering.  Physical therapy will be prescribed on second office visit.  For now, engage in light activity as tolerated, no lifting greater than 5 lb. No driving while on pain meds and must wear cervical collar when in a vehicle for 28 days.  Use the Robaxin for neck spasms as needed. Patient will take ASPIRIN 325mg once daily for 28 days post-operatively for blood clot prevention.

## 2022-10-05 NOTE — BRIEF OPERATIVE NOTE - NSICDXBRIEFPOSTOP_GEN_ALL_CORE_FT
POST-OP DIAGNOSIS:  Radiculopathy due to cervical spondylosis 05-Oct-2022 11:29:33  Wilner Matthews  
POST-OP DIAGNOSIS:  Radiculopathy due to cervical spondylosis 05-Oct-2022 11:29:33  Wilner Matthews

## 2022-10-05 NOTE — BRIEF OPERATIVE NOTE - OPERATION/FINDINGS
severe spondylosis
I assisted all aspects of operative positioning including placing shoulder bolster, taping of shoulders in a slightly dependent position, maintenance of head and an extended position. I obtained fluoroscopic imaging confirming the appropriate visualization of levels.  I participated in operative time out.   I cleansed the operative area with Betadine and RN then prepped with DuraPrep. I participated in draping with blue drapes and ioban and then ensured that drapes were appropriately positioned.I assisted with surgical exposure to the pretracheal prevertebral fascia using toothless pickups and then Cloward retractors during which time I assisted with maintenance of hemostasis with Surgi-Yair, Ray-Linus, persistent suction, intermittent irrigation. I assisted with sequential placement of [place] pins at level and level. Annulotomies were performed at level and level using a Bovie cautery as well as a 15 blade. I assisted with discectomy procedure at C3-C4, C4-C5, C5-C6, C6-C7 by using pituitary, micropituitary and suction.  I assisted with distraction of the disc space using K2 M/Sandy retractor. After placement of intervertebral grafts with a combination of allograft (DBM/Valerie) and autograft (from bur shavings), I assisted with plate placement over the anterior cervical spine by using Cloward retraction, intermittent irrigation, consistent suction.  I verified that screws were placed, tightened and torqued per Sandy protocol. Copious irrigation was then used, final hemostasis was performed with FloSeal, Ray-Linus, and bipolar cautery. Fluoroscopic imaging confirmed appropriate placement of implants. I confirmed with operating surgeon and neuro monitoring that final neuro monitoring was stable.  10 round OSMAN drain was placed and  Closure was performed platysma with 2-0 vicryl, superficial fascia with 3-0 vicryl and skin with 4-0 MONOCRYL.

## 2022-10-05 NOTE — ASU PREOP CHECKLIST - AICD PRESENT
[Dear  ___] : Dear  [unfilled], [Consult Letter:] : I had the pleasure of evaluating your patient, [unfilled]. [Please see my note below.] : Please see my note below. [Consult Closing:] : Thank you very much for allowing me to participate in the care of this patient.  If you have any questions, please do not hesitate to contact me. no [Sincerely,] : Sincerely, [DrViviana  ___] : Dr. HANSEN [DrViviana ___] : Dr. HANSEN [FreeTextEntry3] : Ronan

## 2022-10-05 NOTE — BRIEF OPERATIVE NOTE - NSICDXBRIEFPROCEDURE_GEN_ALL_CORE_FT
PROCEDURES:  Fusion of cervical spine at 4 levels 05-Oct-2022 11:28:50  Wilner Matthews  
PROCEDURES:  Fusion of cervical spine at 4 levels 05-Oct-2022 11:28:50  Wilner Matthews

## 2022-10-05 NOTE — DISCHARGE NOTE PROVIDER - NSDCCPTREATMENT_GEN_ALL_CORE_FT
PRINCIPAL PROCEDURE  Procedure: Fusion of cervical spine at 4 levels  Findings and Treatment:

## 2022-10-05 NOTE — PROGRESS NOTE ADULT - SUBJECTIVE AND OBJECTIVE BOX
MINH GOETZ  [id number]  67yMale    STATUS POST:  ACDF C3-C4,C4-C5, C5-C6, C6-C7 for cervical stenosis with myelopathy and right upper extremity radiculitis.    Cervical radiculopathy    Family history of brain cancer (Mother)    FH: heart disease (Father)    Handoff    HTN (hypertension)    Hyperlipemia    CAD (coronary artery disease)    At risk for sleep apnea    Cervical spondylosis    Radiculopathy due to cervical spondylosis    Cervical spondylosis with radiculopathy    Spondylosis without myelopathy or radiculopathy, cervical region    Spondylosis without myelopathy or radiculopathy, cervical region    Cervical radiculopathy    Hypertension    CAD (coronary artery disease)    Screening for substance abuse    Need for prophylactic measure    Fusion of cervical spine at 4 levels    Carpal tunnel syndrome of right wrist    History of open heart surgery    Achilles tendon injury    H/O colonoscopy    RADICULOPATHY, CERVICAL REGION        SUBJECTIVE: Patient seen and examined doing well  Pain controlled, positive posterior neck pain as expected     OBJECTIVE:   T(C): 36.6 (10-05-22 @ 11:09), Max: 36.6 (10-05-22 @ 11:09)  HR: 63 (10-05-22 @ 11:09) (63 - 63)  BP: 133/71 (10-05-22 @ 11:09) (133/71 - 133/71)  RR: 16 (10-05-22 @ 11:09) (16 - 16)  SpO2: 100% (10-05-22 @ 11:09) (100% - 100%)  Constitutional: Pleasant in no acute distress  Psych:A&Ox3  EENT: no dysphonia, no dyspnea.  mild dysphagia as expected  Abdominal: soft and supple non distended  Lymphatics: no pretibial pitting edema  Spine:          Dressing:  clean/dry/intact, OSMAN patent               Sensation:          Upper extremity          grossly intact manually,     distribution paresthesia on the right     much improved          Lower extremity           grossly intact manually                               Motor:                   Lower and upper extremity grossly intact manually, positive posterior cervicalgia as expected            Vascular:[x] warm well perfused; capillary refill <3 seconds                A/P :67y MaleS/P ACDF as above  POD#0  -    Pain control- multimodal approach.  Avoid NSAIDS x 6 weeks post op since they may deter fusion.  Focus on muscle relaxers, heat.   -    DVT ppx: [ x]SCDs, early ambulation,  OMA247 mg daily x 28 days post op then revert back to ASA 81 mg daily as prior to surgery.   -    Periop abx:  Ancef [x ]        -    Likely 23 hour admission  -    Monitor Drain Output, can discontinue drain when output equal or less than 10 cc/hr/12 hr.  change dressing when drain pulled   -    Resume home meds as appropriate, Dr Mon will follow  - history of angina- monitored bed, continue cardiac meds   -PT /OT WBAT, balance and gait  -brace cervical collar with OOB is for comfort and not mandatory, never to be worn in bed, with eating or hygeine but should be worn when in a motor vehicle x 28 days post op.   - patient should be reassured that it is normal to have dysphagia post operative, encourage soft diet, cutting food in small pieces.  cepacol losenges ordered  - for myelopathy will hold decadron/medrol katie because it interacts with Ranexa

## 2022-10-05 NOTE — CONSULT NOTE ADULT - SUBJECTIVE AND OBJECTIVE BOX
HPI:  66 y/o male with PMH CAD s/p CABGx3 2015, HTN, HLD presents with c/o posterior neck pain. Reports neck pain for about 7 years and worsening the past few months. Post op  Anterior cervical discectomy fusion C3-4 C6-7 with Dr. Matthews today       Home Medications:     · 	atorvastatin 40 mg oral tablet: Last Dose Taken:  , 1 tab(s) orally once a day (at bedtime)  · 	ramipril 2.5 mg oral capsule: Last Dose Taken:  , 1 cap(s) orally once a day  · 	ranolazine 500 mg oral tablet, extended release: 1 tab(s) orally 2 times a day  · 	pantoprazole 40 mg oral delayed release tablet: Last Dose Taken:  , orally every other day  · 	Probiotic Formula oral capsule: 1 cap(s) orally once a day  · 	Abby Aspirin: Last Dose Taken:  , 81 milligram(s) orally once a day  · 	metoprolol succinate 25 mg oral tablet, extended release: Last Dose Taken:  , 1 tab(s) orally once a day  · 	isosorbide mononitrate 60 mg oral tablet, extended release: Last Dose Taken:  , 1 tab(s) orally once a day (in the morning)  · 	Multiple Vitamins oral tablet: Last Dose Taken:  , 1 tab(s) orally once a day        PAST MEDICAL & SURGICAL HISTORY:  HTN (hypertension)      Hyperlipemia      CAD (coronary artery disease)  CABG x3- 2015      At risk for sleep apnea  BANG 5      Cervical spondylosis      Carpal tunnel syndrome of right wrist  2016      History of open heart surgery  CABG x3- 2015      Achilles tendon injury  Right torn achilles- age 16      H/O colonoscopy  2016        acetaminophen     Tablet .. 1000 milliGRAM(s) Oral every 8 hours  aluminum hydroxide/magnesium hydroxide/simethicone Suspension 30 milliLiter(s) Oral every 12 hours PRN  atorvastatin 40 milliGRAM(s) Oral at bedtime  benzocaine 15 mG/menthol 3.6 mG Lozenge 1 Lozenge Oral every 3 hours PRN  ceFAZolin   IVPB 2000 milliGRAM(s) IV Intermittent <User Schedule>  celecoxib 200 milliGRAM(s) Oral every 12 hours  fentaNYL    Injectable 50 MICROGram(s) IV Push every 5 minutes PRN  isosorbide   mononitrate ER Tablet (IMDUR) 60 milliGRAM(s) Oral daily  lactated ringers. 1000 milliLiter(s) IV Continuous <Continuous>  lactated ringers. 1000 milliLiter(s) IV Continuous <Continuous>  magnesium hydroxide Suspension 30 milliLiter(s) Oral every 12 hours PRN  methocarbamol 750 milliGRAM(s) Oral every 8 hours  metoprolol succinate ER 25 milliGRAM(s) Oral daily  ondansetron Injectable 4 milliGRAM(s) IV Push once PRN  ondansetron Injectable 4 milliGRAM(s) IV Push every 6 hours PRN  ondansetron Injectable 4 milliGRAM(s) IV Push every 6 hours PRN  ondansetron Injectable 4 milliGRAM(s) IV Push once PRN  oxyCODONE    IR 10 milliGRAM(s) Oral every 3 hours PRN  oxyCODONE    IR 5 milliGRAM(s) Oral every 3 hours PRN  pantoprazole    Tablet 40 milliGRAM(s) Oral before breakfast  ranolazine 500 milliGRAM(s) Oral two times a day  senna 2 Tablet(s) Oral at bedtime    MEDICATIONS  (STANDING):  acetaminophen     Tablet .. 1000 milliGRAM(s) Oral every 8 hours  atorvastatin 40 milliGRAM(s) Oral at bedtime  ceFAZolin   IVPB 2000 milliGRAM(s) IV Intermittent <User Schedule>  celecoxib 200 milliGRAM(s) Oral every 12 hours  isosorbide   mononitrate ER Tablet (IMDUR) 60 milliGRAM(s) Oral daily  lactated ringers. 1000 milliLiter(s) (75 mL/Hr) IV Continuous <Continuous>  lactated ringers. 1000 milliLiter(s) (80 mL/Hr) IV Continuous <Continuous>  methocarbamol 750 milliGRAM(s) Oral every 8 hours  metoprolol succinate ER 25 milliGRAM(s) Oral daily  pantoprazole    Tablet 40 milliGRAM(s) Oral before breakfast  ranolazine 500 milliGRAM(s) Oral two times a day  senna 2 Tablet(s) Oral at bedtime    MEDICATIONS  (PRN):  aluminum hydroxide/magnesium hydroxide/simethicone Suspension 30 milliLiter(s) Oral every 12 hours PRN Indigestion  benzocaine 15 mG/menthol 3.6 mG Lozenge 1 Lozenge Oral every 3 hours PRN Sore Throat  fentaNYL    Injectable 50 MICROGram(s) IV Push every 5 minutes PRN Severe Pain (7 - 10)  magnesium hydroxide Suspension 30 milliLiter(s) Oral every 12 hours PRN Constipation  ondansetron Injectable 4 milliGRAM(s) IV Push once PRN Nausea and/or Vomiting  ondansetron Injectable 4 milliGRAM(s) IV Push every 6 hours PRN Nausea and/or Vomiting  ondansetron Injectable 4 milliGRAM(s) IV Push every 6 hours PRN Nausea and/or Vomiting  ondansetron Injectable 4 milliGRAM(s) IV Push once PRN Nausea and/or Vomiting  oxyCODONE    IR 10 milliGRAM(s) Oral every 3 hours PRN Severe Pain (7 - 10)  oxyCODONE    IR 5 milliGRAM(s) Oral every 3 hours PRN Moderate Pain (4 - 6)      Allergies    Cantaloupe (Anaphylaxis)  No Known Drug Allergies    Intolerances        SOCIAL HISTORY:  No S/D/IVDU    FAMILY HISTORY:  Family history of brain cancer (Mother)    FH: heart disease (Father)          ROS  - Headache  + Neck Pain  - Chest Pain  - SOB  - Abd pain  - Pelvic Pain  - Leg Pain  - Head Ache      Vital Signs Last 24 Hrs  T(C): 36.4 (05 Oct 2022 19:15), Max: 36.6 (05 Oct 2022 11:09)  T(F): 97.6 (05 Oct 2022 19:15), Max: 97.9 (05 Oct 2022 11:09)  HR: 55 (05 Oct 2022 20:00) (55 - 73)  BP: 136/74 (05 Oct 2022 19:30) (133/71 - 179/84)  BP(mean): 89 (05 Oct 2022 19:30) (89 - 89)  RR: 22 (05 Oct 2022 20:00) (12 - 22)  SpO2: 100% (05 Oct 2022 20:00) (96% - 100%)    Parameters below as of 05 Oct 2022 20:00  Patient On (Oxygen Delivery Method): nasal cannula  O2 Flow (L/min): 2    HEENT: PEARLA  Neck: Supple  Cardio: S1 S2 No Murmur  Pulm: CTA No Rales or Ronchi  Abd: Soft NT ND BS+  Rectal - refused  Ext: No DCT  Skin: No Rash  Neuro: Awake Pleasant    Cervical spondylosis anterior cervical discectomy fusion C3-4 C6-7  Post op HTN - secondary to stress  HTN (hypertension) - cont meds with parameters avoid   Hyperlipemia  CAD (coronary artery disease) CABG x3- 2015 - cont metoprolol, isosorbide, Asa

## 2022-10-06 LAB
ANION GAP SERPL CALC-SCNC: 14 MMOL/L — SIGNIFICANT CHANGE UP (ref 5–17)
BUN SERPL-MCNC: 18.1 MG/DL — SIGNIFICANT CHANGE UP (ref 8–20)
CALCIUM SERPL-MCNC: 9.4 MG/DL — SIGNIFICANT CHANGE UP (ref 8.4–10.5)
CHLORIDE SERPL-SCNC: 100 MMOL/L — SIGNIFICANT CHANGE UP (ref 98–107)
CO2 SERPL-SCNC: 23 MMOL/L — SIGNIFICANT CHANGE UP (ref 22–29)
CREAT SERPL-MCNC: 1.12 MG/DL — SIGNIFICANT CHANGE UP (ref 0.5–1.3)
EGFR: 71 ML/MIN/1.73M2 — SIGNIFICANT CHANGE UP
GLUCOSE SERPL-MCNC: 179 MG/DL — HIGH (ref 70–99)
POTASSIUM SERPL-MCNC: 4.5 MMOL/L — SIGNIFICANT CHANGE UP (ref 3.5–5.3)
POTASSIUM SERPL-SCNC: 4.5 MMOL/L — SIGNIFICANT CHANGE UP (ref 3.5–5.3)
SODIUM SERPL-SCNC: 137 MMOL/L — SIGNIFICANT CHANGE UP (ref 135–145)

## 2022-10-06 RX ORDER — ASPIRIN/CALCIUM CARB/MAGNESIUM 324 MG
1 TABLET ORAL
Qty: 27 | Refills: 0
Start: 2022-10-06 | End: 2022-11-01

## 2022-10-06 RX ORDER — SENNOSIDES/DOCUSATE SODIUM 8.6MG-50MG
2 TABLET ORAL
Qty: 20 | Refills: 0
Start: 2022-10-06 | End: 2022-10-15

## 2022-10-06 RX ORDER — ASPIRIN/CALCIUM CARB/MAGNESIUM 324 MG
81 TABLET ORAL
Qty: 0 | Refills: 0 | DISCHARGE

## 2022-10-06 RX ORDER — METHOCARBAMOL 500 MG/1
1 TABLET, FILM COATED ORAL
Qty: 30 | Refills: 0
Start: 2022-10-06 | End: 2022-10-15

## 2022-10-06 RX ORDER — ACETAMINOPHEN 500 MG
975 TABLET ORAL EVERY 8 HOURS
Refills: 0 | Status: DISCONTINUED | OUTPATIENT
Start: 2022-10-06 | End: 2022-10-07

## 2022-10-06 RX ORDER — TAMSULOSIN HYDROCHLORIDE 0.4 MG/1
0.4 CAPSULE ORAL AT BEDTIME
Refills: 0 | Status: DISCONTINUED | OUTPATIENT
Start: 2022-10-06 | End: 2022-10-07

## 2022-10-06 RX ORDER — BENZOCAINE AND MENTHOL 5; 1 G/100ML; G/100ML
1 LIQUID ORAL
Qty: 15 | Refills: 0
Start: 2022-10-06

## 2022-10-06 RX ORDER — OXYCODONE HYDROCHLORIDE 5 MG/1
1 TABLET ORAL
Qty: 28 | Refills: 0
Start: 2022-10-06 | End: 2022-10-12

## 2022-10-06 RX ADMIN — SENNA PLUS 2 TABLET(S): 8.6 TABLET ORAL at 22:22

## 2022-10-06 RX ADMIN — ISOSORBIDE MONONITRATE 60 MILLIGRAM(S): 60 TABLET, EXTENDED RELEASE ORAL at 11:33

## 2022-10-06 RX ADMIN — TAMSULOSIN HYDROCHLORIDE 0.4 MILLIGRAM(S): 0.4 CAPSULE ORAL at 22:22

## 2022-10-06 RX ADMIN — TAMSULOSIN HYDROCHLORIDE 0.4 MILLIGRAM(S): 0.4 CAPSULE ORAL at 11:34

## 2022-10-06 RX ADMIN — Medication 325 MILLIGRAM(S): at 11:28

## 2022-10-06 RX ADMIN — Medication 975 MILLIGRAM(S): at 06:00

## 2022-10-06 RX ADMIN — METHOCARBAMOL 750 MILLIGRAM(S): 500 TABLET, FILM COATED ORAL at 15:05

## 2022-10-06 RX ADMIN — METHOCARBAMOL 750 MILLIGRAM(S): 500 TABLET, FILM COATED ORAL at 22:22

## 2022-10-06 RX ADMIN — Medication 975 MILLIGRAM(S): at 23:20

## 2022-10-06 RX ADMIN — Medication 975 MILLIGRAM(S): at 15:05

## 2022-10-06 RX ADMIN — CELECOXIB 200 MILLIGRAM(S): 200 CAPSULE ORAL at 05:00

## 2022-10-06 RX ADMIN — CELECOXIB 200 MILLIGRAM(S): 200 CAPSULE ORAL at 17:01

## 2022-10-06 RX ADMIN — Medication 975 MILLIGRAM(S): at 05:00

## 2022-10-06 RX ADMIN — CELECOXIB 200 MILLIGRAM(S): 200 CAPSULE ORAL at 18:00

## 2022-10-06 RX ADMIN — Medication 25 MILLIGRAM(S): at 05:01

## 2022-10-06 RX ADMIN — Medication 100 MILLIGRAM(S): at 03:59

## 2022-10-06 RX ADMIN — CELECOXIB 200 MILLIGRAM(S): 200 CAPSULE ORAL at 06:00

## 2022-10-06 RX ADMIN — OXYCODONE HYDROCHLORIDE 10 MILLIGRAM(S): 5 TABLET ORAL at 20:04

## 2022-10-06 RX ADMIN — Medication 975 MILLIGRAM(S): at 22:21

## 2022-10-06 RX ADMIN — OXYCODONE HYDROCHLORIDE 10 MILLIGRAM(S): 5 TABLET ORAL at 02:56

## 2022-10-06 RX ADMIN — Medication 975 MILLIGRAM(S): at 16:05

## 2022-10-06 RX ADMIN — OXYCODONE HYDROCHLORIDE 10 MILLIGRAM(S): 5 TABLET ORAL at 21:05

## 2022-10-06 RX ADMIN — ATORVASTATIN CALCIUM 40 MILLIGRAM(S): 80 TABLET, FILM COATED ORAL at 22:23

## 2022-10-06 RX ADMIN — PANTOPRAZOLE SODIUM 40 MILLIGRAM(S): 20 TABLET, DELAYED RELEASE ORAL at 05:01

## 2022-10-06 RX ADMIN — OXYCODONE HYDROCHLORIDE 10 MILLIGRAM(S): 5 TABLET ORAL at 03:50

## 2022-10-06 RX ADMIN — RANOLAZINE 500 MILLIGRAM(S): 500 TABLET, FILM COATED, EXTENDED RELEASE ORAL at 17:01

## 2022-10-06 RX ADMIN — LISINOPRIL 10 MILLIGRAM(S): 2.5 TABLET ORAL at 05:00

## 2022-10-06 RX ADMIN — METHOCARBAMOL 750 MILLIGRAM(S): 500 TABLET, FILM COATED ORAL at 05:01

## 2022-10-06 RX ADMIN — RANOLAZINE 500 MILLIGRAM(S): 500 TABLET, FILM COATED, EXTENDED RELEASE ORAL at 05:15

## 2022-10-06 NOTE — PATIENT PROFILE ADULT - FALL HARM RISK - HARM RISK INTERVENTIONS

## 2022-10-06 NOTE — PROGRESS NOTE ADULT - SUBJECTIVE AND OBJECTIVE BOX
MINH GOETZ    [id number]    History: Patient is status post XX POD # XX.  Patient is doing well and is comfortable. The patient's pain is controlled using the prescribed pain medications. The patient is participating in physical therapy. Denies nausea, vomiting, chest pain, shortness of breath, abdominal pain or fever. No new complaints.    Vital Signs Last 24 Hrs  T(C): 36.3 (06 Oct 2022 04:02), Max: 36.6 (05 Oct 2022 11:09)  T(F): 97.4 (06 Oct 2022 04:02), Max: 97.9 (05 Oct 2022 11:09)  HR: 70 (06 Oct 2022 04:02) (55 - 73)  BP: 176/81 (06 Oct 2022 04:02) (133/71 - 179/84)  BP(mean): 89 (05 Oct 2022 19:30) (89 - 89)  RR: 18 (06 Oct 2022 04:02) (12 - 22)  SpO2: 94% (06 Oct 2022 04:02) (94% - 100%)    Parameters below as of 06 Oct 2022 04:02  Patient On (Oxygen Delivery Method): room air                  MEDICATIONS  (STANDING):  acetaminophen     Tablet .. 975 milliGRAM(s) Oral every 8 hours  aspirin 325 milliGRAM(s) Oral daily  atorvastatin 40 milliGRAM(s) Oral at bedtime  celecoxib 200 milliGRAM(s) Oral every 12 hours  isosorbide   mononitrate ER Tablet (IMDUR) 60 milliGRAM(s) Oral daily  lactated ringers. 1000 milliLiter(s) (80 mL/Hr) IV Continuous <Continuous>  lisinopril 10 milliGRAM(s) Oral daily  methocarbamol 750 milliGRAM(s) Oral every 8 hours  metoprolol succinate ER 25 milliGRAM(s) Oral daily  pantoprazole    Tablet 40 milliGRAM(s) Oral before breakfast  ranolazine 500 milliGRAM(s) Oral two times a day  senna 2 Tablet(s) Oral at bedtime    MEDICATIONS  (PRN):  aluminum hydroxide/magnesium hydroxide/simethicone Suspension 30 milliLiter(s) Oral every 12 hours PRN Indigestion  benzocaine 15 mG/menthol 3.6 mG Lozenge 1 Lozenge Oral every 3 hours PRN Sore Throat  magnesium hydroxide Suspension 30 milliLiter(s) Oral every 12 hours PRN Constipation  ondansetron Injectable 4 milliGRAM(s) IV Push every 6 hours PRN Nausea and/or Vomiting  ondansetron Injectable 4 milliGRAM(s) IV Push every 6 hours PRN Nausea and/or Vomiting  oxyCODONE    IR 10 milliGRAM(s) Oral every 3 hours PRN Severe Pain (7 - 10)  oxyCODONE    IR 5 milliGRAM(s) Oral every 3 hours PRN Moderate Pain (4 - 6)      Vital Signs Last 24 Hrs  T(C): 36.3 (06 Oct 2022 04:02), Max: 36.6 (05 Oct 2022 11:09)  T(F): 97.4 (06 Oct 2022 04:02), Max: 97.9 (05 Oct 2022 11:09)  HR: 70 (06 Oct 2022 04:02) (55 - 73)  BP: 176/81 (06 Oct 2022 04:02) (133/71 - 179/84)  BP(mean): 89 (05 Oct 2022 19:30) (89 - 89)  RR: 18 (06 Oct 2022 04:02) (12 - 22)  SpO2: 94% (06 Oct 2022 04:02) (94% - 100%)    Parameters below as of 06 Oct 2022 04:02  Patient On (Oxygen Delivery Method): room air      Daily Height in cm: 165.1 (05 Oct 2022 11:09)    Daily     Appearance: Alert, responsive, in no acute distress.    Skin: no rash on visible skin. Skin is clean, dry and intact. No bleeding. No abrasions. No ulcerations.    Vascular: 2+ distal pulses. Cap refill < 2 sec. No signs of venous insuffiency or stasis. No extremity ulcerations. No cyanosis.    Musculoskeletal:         Left Upper Extremity: Atraumatic with normal alignment NROM. No crepitus. No bony tenderness.        Right Upper Extremity: Atraumatic with normal alignment NROM. No crepitus. No bony tenderness.        Left Lower Extremity: Atraumatic with normal alignment NROM. No crepitus. No bony tenderness.        Right Lower Extremity: Atraumatic with normal alignment NROM. No crepitus. No bony tenderness.     Neurological: Sensation is grossly intact to light touch. No focal deficits or weaknesses found.    Pathologic reflexes: [ ] Chery,  [ ]  Clonus            Reflexes:   Biceps, Bracioradialis, Patella, Achilles intact            Motor exam: [  ]          [ ] Upper extremity              Bi(c5)  WE(c6)  EE(c7)   FF(c8)                                                R         5/5        5/5        5/5       5/5                                               L          5/5        5/5        5/5       5/5         [ ] Lower extremeity          HF(l2)   KE(l3)    TA(l4)   EHL(l5)  GS(s1)                                                 R        5/5 5/5        5/5       5/5         5/5                                               L         5/5        5/5       5/5       5/5          5/5    Primary Orthopedic Assessment:  • Patient is status post XX POD # XX.    Secondary  Orthopedic Assessment(s):   •     Secondary  Medical Assessment(s):   •     Plan:   • DVT prophylaxis as prescribed, including use of compression devices and ankle pumps  • Continue physical therapy  • Out of Bed as tolerated with assistance of a walker  • Incentive spirometry encouraged  • Pain control as clinically indicated  • Discharge planning – anticipated discharge is Home / subacute rehabilitation / acute rehabilitation    MINH GOETZ    [id number]    History: 68 yo M status post ACDF C3-7 POD # 1.  Patient is doing well and is comfortable. The patient's pain is controlled using the prescribed pain medications. The patient was able to void small amounts overnight but found to be retaining 700ccs of urine on bladder scan. The patient denies bladder pain/discomfort. Denies nausea, vomiting, chest pain, shortness of breath, abdominal pain or fever. No new complaints.    Vital Signs Last 24 Hrs  T(C): 36.3 (06 Oct 2022 04:02), Max: 36.6 (05 Oct 2022 11:09)  T(F): 97.4 (06 Oct 2022 04:02), Max: 97.9 (05 Oct 2022 11:09)  HR: 70 (06 Oct 2022 04:02) (55 - 73)  BP: 176/81 (06 Oct 2022 04:02) (133/71 - 179/84)  BP(mean): 89 (05 Oct 2022 19:30) (89 - 89)  RR: 18 (06 Oct 2022 04:02) (12 - 22)  SpO2: 94% (06 Oct 2022 04:02) (94% - 100%)    Parameters below as of 06 Oct 2022 04:02  Patient On (Oxygen Delivery Method): room air          MEDICATIONS  (STANDING):  acetaminophen     Tablet .. 975 milliGRAM(s) Oral every 8 hours  aspirin 325 milliGRAM(s) Oral daily  atorvastatin 40 milliGRAM(s) Oral at bedtime  celecoxib 200 milliGRAM(s) Oral every 12 hours  isosorbide   mononitrate ER Tablet (IMDUR) 60 milliGRAM(s) Oral daily  lactated ringers. 1000 milliLiter(s) (80 mL/Hr) IV Continuous <Continuous>  lisinopril 10 milliGRAM(s) Oral daily  methocarbamol 750 milliGRAM(s) Oral every 8 hours  metoprolol succinate ER 25 milliGRAM(s) Oral daily  pantoprazole    Tablet 40 milliGRAM(s) Oral before breakfast  ranolazine 500 milliGRAM(s) Oral two times a day  senna 2 Tablet(s) Oral at bedtime    MEDICATIONS  (PRN):  aluminum hydroxide/magnesium hydroxide/simethicone Suspension 30 milliLiter(s) Oral every 12 hours PRN Indigestion  benzocaine 15 mG/menthol 3.6 mG Lozenge 1 Lozenge Oral every 3 hours PRN Sore Throat  magnesium hydroxide Suspension 30 milliLiter(s) Oral every 12 hours PRN Constipation  ondansetron Injectable 4 milliGRAM(s) IV Push every 6 hours PRN Nausea and/or Vomiting  ondansetron Injectable 4 milliGRAM(s) IV Push every 6 hours PRN Nausea and/or Vomiting  oxyCODONE    IR 10 milliGRAM(s) Oral every 3 hours PRN Severe Pain (7 - 10)  oxyCODONE    IR 5 milliGRAM(s) Oral every 3 hours PRN Moderate Pain (4 - 6)      Vital Signs Last 24 Hrs  T(C): 36.3 (06 Oct 2022 04:02), Max: 36.6 (05 Oct 2022 11:09)  T(F): 97.4 (06 Oct 2022 04:02), Max: 97.9 (05 Oct 2022 11:09)  HR: 70 (06 Oct 2022 04:02) (55 - 73)  BP: 176/81 (06 Oct 2022 04:02) (133/71 - 179/84)  BP(mean): 89 (05 Oct 2022 19:30) (89 - 89)  RR: 18 (06 Oct 2022 04:02) (12 - 22)  SpO2: 94% (06 Oct 2022 04:02) (94% - 100%)    Parameters below as of 06 Oct 2022 04:02  Patient On (Oxygen Delivery Method): room air      Daily Height in cm: 165.1 (05 Oct 2022 11:09)    Daily     Appearance: Alert, responsive, in no acute distress.    Skin: no rash on visible skin. Skin is clean, dry and intact. No bleeding. No abrasions. No ulcerations.    Vascular: 2+ distal pulses. Cap refill < 2 sec. No signs of venous insuffiency or stasis. No extremity ulcerations. No cyanosis.    Musculoskeletal:         Left Upper Extremity: Atraumatic with normal alignment NROM. No crepitus. No bony tenderness.        Right Upper Extremity: Atraumatic with normal alignment NROM. No crepitus. No bony tenderness.        Left Lower Extremity: Atraumatic with normal alignment NROM. No crepitus. No bony tenderness.        Right Lower Extremity: Atraumatic with normal alignment NROM. No crepitus. No bony tenderness.     Neurological: Sensation is grossly intact to light touch. No focal deficits or weaknesses found.    Pathologic reflexes: [ ] Chery,  [ ]  Clonus            Reflexes:   Biceps, Bracioradialis, Patella, Achilles intact            Motor exam: [  ]          [ ] Upper extremity              Bi(c5)  WE(c6)  EE(c7)   FF(c8)                                                R         5/5        5/5        5/5       5/5                                               L          5/5        5/5        5/5       5/5         [ ] Lower extremeity          HF(l2)   KE(l3)    TA(l4)   EHL(l5)  GS(s1)                                                 R        5/5        5/5        5/5       5/5         5/5                                               L         5/5        5/5       5/5       5/5          5/5    Primary Orthopedic Assessment:  • Patient is status post XX POD # XX.    Secondary  Orthopedic Assessment(s):   •     Secondary  Medical Assessment(s):   •     Plan:   • DVT prophylaxis as prescribed, including use of compression devices and ankle pumps  • Continue physical therapy  • Out of Bed as tolerated with assistance of a walker  • Incentive spirometry encouraged  • Pain control as clinically indicated  • Discharge planning – anticipated discharge is Home / subacute rehabilitation / acute rehabilitation    MINH GOETZ    [id number]    History: 68 yo M status post ACDF C3-7 POD # 1.  Patient is doing well and is comfortable. The patient's pain is controlled using the prescribed pain medications. The patient was able to void small amounts overnight but found to be retaining 700ccs of urine on bladder scan. The patient denies bladder pain/discomfort. Denies nausea, vomiting, chest pain, shortness of breath, abdominal pain or fever. No new complaints.    Vital Signs Last 24 Hrs  T(C): 36.3 (06 Oct 2022 04:02), Max: 36.6 (05 Oct 2022 11:09)  T(F): 97.4 (06 Oct 2022 04:02), Max: 97.9 (05 Oct 2022 11:09)  HR: 70 (06 Oct 2022 04:02) (55 - 73)  BP: 176/81 (06 Oct 2022 04:02) (133/71 - 179/84)  BP(mean): 89 (05 Oct 2022 19:30) (89 - 89)  RR: 18 (06 Oct 2022 04:02) (12 - 22)  SpO2: 94% (06 Oct 2022 04:02) (94% - 100%)    Parameters below as of 06 Oct 2022 04:02  Patient On (Oxygen Delivery Method): room air          MEDICATIONS  (STANDING):  acetaminophen     Tablet .. 975 milliGRAM(s) Oral every 8 hours  aspirin 325 milliGRAM(s) Oral daily  atorvastatin 40 milliGRAM(s) Oral at bedtime  celecoxib 200 milliGRAM(s) Oral every 12 hours  isosorbide   mononitrate ER Tablet (IMDUR) 60 milliGRAM(s) Oral daily  lactated ringers. 1000 milliLiter(s) (80 mL/Hr) IV Continuous <Continuous>  lisinopril 10 milliGRAM(s) Oral daily  methocarbamol 750 milliGRAM(s) Oral every 8 hours  metoprolol succinate ER 25 milliGRAM(s) Oral daily  pantoprazole    Tablet 40 milliGRAM(s) Oral before breakfast  ranolazine 500 milliGRAM(s) Oral two times a day  senna 2 Tablet(s) Oral at bedtime    MEDICATIONS  (PRN):  aluminum hydroxide/magnesium hydroxide/simethicone Suspension 30 milliLiter(s) Oral every 12 hours PRN Indigestion  benzocaine 15 mG/menthol 3.6 mG Lozenge 1 Lozenge Oral every 3 hours PRN Sore Throat  magnesium hydroxide Suspension 30 milliLiter(s) Oral every 12 hours PRN Constipation  ondansetron Injectable 4 milliGRAM(s) IV Push every 6 hours PRN Nausea and/or Vomiting  ondansetron Injectable 4 milliGRAM(s) IV Push every 6 hours PRN Nausea and/or Vomiting  oxyCODONE    IR 10 milliGRAM(s) Oral every 3 hours PRN Severe Pain (7 - 10)  oxyCODONE    IR 5 milliGRAM(s) Oral every 3 hours PRN Moderate Pain (4 - 6)      Vital Signs Last 24 Hrs  T(C): 36.3 (06 Oct 2022 04:02), Max: 36.6 (05 Oct 2022 11:09)  T(F): 97.4 (06 Oct 2022 04:02), Max: 97.9 (05 Oct 2022 11:09)  HR: 70 (06 Oct 2022 04:02) (55 - 73)  BP: 176/81 (06 Oct 2022 04:02) (133/71 - 179/84)  BP(mean): 89 (05 Oct 2022 19:30) (89 - 89)  RR: 18 (06 Oct 2022 04:02) (12 - 22)  SpO2: 94% (06 Oct 2022 04:02) (94% - 100%)    Parameters below as of 06 Oct 2022 04:02  Patient On (Oxygen Delivery Method): room air      Daily Height in cm: 165.1 (05 Oct 2022 11:09)    Daily     Appearance: Alert, responsive, in no acute distress.    Skin: no rash on visible skin. Skin is clean, dry and intact. No bleeding. No abrasions. No ulcerations.    Musculoskeletal:         cervical: dressing clean, dry, intact. +OSMAN drain.       Left Upper Extremity:  Sensation is grossly intact to light touch. 2+ distal pulses. Cap refill < 2 sec.           Right Upper Extremity: Sensation is grossly intact to light touch. 2+ distal pulses. Cap refill < 2 sec.     Neurological: Sensation is grossly intact to light touch. No focal deficits or weaknesses found.    Motor exam: [  ]          [ ] Upper extremity              Bi(c5)  WE(c6)  EE(c7)   FF(c8)                                                R         5/5        5/5        5/5       5/5                                               L          5/5        5/5        5/5       5/5    Primary Orthopedic Assessment:  • Patient is status post XX POD # XX.    Secondary  Orthopedic Assessment(s):   •     Secondary  Medical Assessment(s):   •     Plan:   • DVT prophylaxis as prescribed, including use of compression devices and ankle pumps  • Continue physical therapy  • Out of Bed as tolerated with assistance of a walker  • Incentive spirometry encouraged  • Pain control as clinically indicated  • Discharge planning – anticipated discharge is Home / subacute rehabilitation / acute rehabilitation    MINH GOETZ    [id number]    History: 66 yo M status post ACDF C3-7 POD # 1.  Patient is doing well and is comfortable. The patient's pain is controlled using the prescribed pain medications. The patient was able to void small amounts overnight but found to be retaining 700ccs of urine on bladder scan. The patient denies bladder pain/discomfort. Denies nausea, vomiting, chest pain, shortness of breath, abdominal pain or fever. No new complaints.    Vital Signs Last 24 Hrs  T(C): 36.3 (06 Oct 2022 04:02), Max: 36.6 (05 Oct 2022 11:09)  T(F): 97.4 (06 Oct 2022 04:02), Max: 97.9 (05 Oct 2022 11:09)  HR: 70 (06 Oct 2022 04:02) (55 - 73)  BP: 176/81 (06 Oct 2022 04:02) (133/71 - 179/84)  BP(mean): 89 (05 Oct 2022 19:30) (89 - 89)  RR: 18 (06 Oct 2022 04:02) (12 - 22)  SpO2: 94% (06 Oct 2022 04:02) (94% - 100%)    Parameters below as of 06 Oct 2022 04:02  Patient On (Oxygen Delivery Method): room air          MEDICATIONS  (STANDING):  acetaminophen     Tablet .. 975 milliGRAM(s) Oral every 8 hours  aspirin 325 milliGRAM(s) Oral daily  atorvastatin 40 milliGRAM(s) Oral at bedtime  celecoxib 200 milliGRAM(s) Oral every 12 hours  isosorbide   mononitrate ER Tablet (IMDUR) 60 milliGRAM(s) Oral daily  lactated ringers. 1000 milliLiter(s) (80 mL/Hr) IV Continuous <Continuous>  lisinopril 10 milliGRAM(s) Oral daily  methocarbamol 750 milliGRAM(s) Oral every 8 hours  metoprolol succinate ER 25 milliGRAM(s) Oral daily  pantoprazole    Tablet 40 milliGRAM(s) Oral before breakfast  ranolazine 500 milliGRAM(s) Oral two times a day  senna 2 Tablet(s) Oral at bedtime    MEDICATIONS  (PRN):  aluminum hydroxide/magnesium hydroxide/simethicone Suspension 30 milliLiter(s) Oral every 12 hours PRN Indigestion  benzocaine 15 mG/menthol 3.6 mG Lozenge 1 Lozenge Oral every 3 hours PRN Sore Throat  magnesium hydroxide Suspension 30 milliLiter(s) Oral every 12 hours PRN Constipation  ondansetron Injectable 4 milliGRAM(s) IV Push every 6 hours PRN Nausea and/or Vomiting  ondansetron Injectable 4 milliGRAM(s) IV Push every 6 hours PRN Nausea and/or Vomiting  oxyCODONE    IR 10 milliGRAM(s) Oral every 3 hours PRN Severe Pain (7 - 10)  oxyCODONE    IR 5 milliGRAM(s) Oral every 3 hours PRN Moderate Pain (4 - 6)      Vital Signs Last 24 Hrs  T(C): 36.3 (06 Oct 2022 04:02), Max: 36.6 (05 Oct 2022 11:09)  T(F): 97.4 (06 Oct 2022 04:02), Max: 97.9 (05 Oct 2022 11:09)  HR: 70 (06 Oct 2022 04:02) (55 - 73)  BP: 176/81 (06 Oct 2022 04:02) (133/71 - 179/84)  BP(mean): 89 (05 Oct 2022 19:30) (89 - 89)  RR: 18 (06 Oct 2022 04:02) (12 - 22)  SpO2: 94% (06 Oct 2022 04:02) (94% - 100%)    Parameters below as of 06 Oct 2022 04:02  Patient On (Oxygen Delivery Method): room air      Daily Height in cm: 165.1 (05 Oct 2022 11:09)    Daily     Appearance: Alert, responsive, in no acute distress.    Skin: no rash on visible skin. Skin is clean, dry and intact. No bleeding. No abrasions. No ulcerations.    Musculoskeletal:         cervical: dressing clean, dry, intact. +OSMAN drain.       Left Upper Extremity:  Sensation is grossly intact to light touch. 2+ distal pulses. Cap refill < 2 sec.           Right Upper Extremity: Sensation is grossly intact to light touch. 2+ distal pulses. Cap refill < 2 sec.     Neurological: Sensation is grossly intact to light touch. No focal deficits or weaknesses found.    Motor exam: [  ]          [ ] Upper extremity              Bi(c5)  WE(c6)  EE(c7)   FF(c8)                                                R         5/5        5/5        5/5       5/5                                               L          5/5        5/5        5/5       5/5    Primary Orthopedic Assessment:  •  66 yo M status post ACDF C3-7 POD # 1.    Secondary  Orthopedic Assessment(s):   •     Secondary  Medical Assessment(s):   •     Plan:   • DVT prophylaxis as prescribed, including use of compression devices and ankle pumps  • Continue physical therapy  • Out of Bed as tolerated with assistance of a walker  • Incentive spirometry encouraged  • Monitor OSMAN drain  • The patient is continuing to refuse straight cath, will attempt to void after walking with PT.  • Pain control as clinically indicated  • Discharge planning – anticipated discharge is Home

## 2022-10-06 NOTE — PHYSICAL THERAPY INITIAL EVALUATION ADULT - ADDITIONAL COMMENTS
Pt reports independent PTA, owns no DME. + , independent ADLs, wife is retired and can assist if needed.  Cervical collar at bedside

## 2022-10-06 NOTE — OCCUPATIONAL THERAPY INITIAL EVALUATION ADULT - GENERAL OBSERVATIONS, REHAB EVAL
Received pt semifowler in bed, NAD, +c-collar, +IV, +Tele/, +OSMAN drain, +on RA, A&Ox4. Pre/post pain level 1/10-neck/right hand; RN aware. Patient agreeable to OT evaluation

## 2022-10-06 NOTE — OCCUPATIONAL THERAPY INITIAL EVALUATION ADULT - ADDITIONAL COMMENTS
Pt has a shower with doors and no grab bars. Pt owns no DME. Pt is right handed and drives. Pt reports avid walker ~4miles/day. Pt wife primarily responsible for all IADLs.

## 2022-10-06 NOTE — OCCUPATIONAL THERAPY INITIAL EVALUATION ADULT - PERTINENT HX OF CURRENT PROBLEM, REHAB EVAL
Pt presents with c/o posterior neck pain. Reports neck pain for about 7 years, worsening the past few months. Pain radiates down to R arm and R fingertips with c/o numbness and tingling to R arm to fingertips.

## 2022-10-06 NOTE — OCCUPATIONAL THERAPY INITIAL EVALUATION ADULT - LIVES WITH, PROFILE
Pt reports lives in a cottage with his wife who is also retired and able to assist. No steps to enter, 6+6 steps inside with hand rail to bed/bath/spouse

## 2022-10-06 NOTE — OCCUPATIONAL THERAPY INITIAL EVALUATION ADULT - SENSORY TESTS
Pt reports numbness/tingling in R hand first 3 digits; RN aware. Pt with capillary refill in bilateral UE all digits

## 2022-10-06 NOTE — PHYSICAL THERAPY INITIAL EVALUATION ADULT - PERTINENT HX OF CURRENT PROBLEM, REHAB EVAL
Pt is 66 y/o male with PMH CAD s/p CABGx3 2015, HTN, HLD presents with c/o posterior neck pain. Reports neck pain for about 7 years and worsening the past few months. Now s/p  Anterior cervical discectomy fusion C3-4 C6-7 with Dr. Matthews (10/5)

## 2022-10-06 NOTE — OCCUPATIONAL THERAPY INITIAL EVALUATION ADULT - LIGHT TOUCH SENSATION, RUE, REHAB EVAL
patient reports numb/tingling in first 3 digits, pt able to detect light touch and ability to localize/mild impairment

## 2022-10-06 NOTE — PROGRESS NOTE ADULT - SUBJECTIVE AND OBJECTIVE BOX
HPI:  68 y/o male with PMH CAD s/p CABGx3 2015, HTN, HLD presents with c/o posterior neck pain POD #1    (15 Sep 2022 11:29)     Allergies    Cantaloupe (Anaphylaxis)  No Known Drug Allergies    Intolerances      HTN (hypertension)    Hyperlipemia    CAD (coronary artery disease)    At risk for sleep apnea    Cervical spondylosis      MEDICATIONS  (STANDING):  acetaminophen     Tablet .. 975 milliGRAM(s) Oral every 8 hours  aspirin 325 milliGRAM(s) Oral daily  atorvastatin 40 milliGRAM(s) Oral at bedtime  celecoxib 200 milliGRAM(s) Oral every 12 hours  isosorbide   mononitrate ER Tablet (IMDUR) 60 milliGRAM(s) Oral daily  lactated ringers. 1000 milliLiter(s) (80 mL/Hr) IV Continuous <Continuous>  lisinopril 10 milliGRAM(s) Oral daily  methocarbamol 750 milliGRAM(s) Oral every 8 hours  metoprolol succinate ER 25 milliGRAM(s) Oral daily  pantoprazole    Tablet 40 milliGRAM(s) Oral before breakfast  ranolazine 500 milliGRAM(s) Oral two times a day  senna 2 Tablet(s) Oral at bedtime  tamsulosin 0.4 milliGRAM(s) Oral at bedtime    MEDICATIONS  (PRN):  aluminum hydroxide/magnesium hydroxide/simethicone Suspension 30 milliLiter(s) Oral every 12 hours PRN Indigestion  benzocaine 15 mG/menthol 3.6 mG Lozenge 1 Lozenge Oral every 3 hours PRN Sore Throat  magnesium hydroxide Suspension 30 milliLiter(s) Oral every 12 hours PRN Constipation  ondansetron Injectable 4 milliGRAM(s) IV Push every 6 hours PRN Nausea and/or Vomiting  ondansetron Injectable 4 milliGRAM(s) IV Push every 6 hours PRN Nausea and/or Vomiting  oxyCODONE    IR 10 milliGRAM(s) Oral every 3 hours PRN Severe Pain (7 - 10)  oxyCODONE    IR 5 milliGRAM(s) Oral every 3 hours PRN Moderate Pain (4 - 6)       10-06    137  |  100  |  18.1  ----------------------------<  179<H>  4.5   |  23.0  |  1.12    Ca    9.4      06 Oct 2022 11:08        ;  Vital Signs Last 24 Hrs  T(C): 36.8 (06 Oct 2022 17:00), Max: 36.9 (06 Oct 2022 11:16)  T(F): 98.3 (06 Oct 2022 17:00), Max: 98.5 (06 Oct 2022 11:16)  HR: 61 (06 Oct 2022 17:00) (55 - 77)  BP: 155/77 (06 Oct 2022 17:00) (136/74 - 176/81)  BP(mean): 89 (05 Oct 2022 19:30) (89 - 89)  RR: 18 (06 Oct 2022 17:00) (12 - 22)  SpO2: 93% (06 Oct 2022 17:00) (93% - 100%)    Parameters below as of 06 Oct 2022 11:16  Patient On (Oxygen Delivery Method): room air      CAPILLARY BLOOD GLUCOSE      10-05 @ 07:01  -  10-06 @ 07:00  --------------------------------------------------------  IN: 900 mL / OUT: 410 mL / NET: 490 mL    10-06 @ 07:01  -  10-06 @ 18:53  --------------------------------------------------------  IN: 0 mL / OUT: 1645 mL / NET: -1645 mL      Patient feeling better No CP, No SOB, No N/V Mild Neck Pain     HEENT: PEARLA  Neck: Supple  Cardio: S1 S2 No Murmur  Pulm: CTA No Rales or Ronchi  Abd: Soft NT ND BS+  Rectal - refused  Ext: No DCT  Skin: No Rash  Neuro: Awake Pleasant    Cervical spondylosis anterior cervical discectomy fusion C3-4 C6-7  Post obstuctive uropathy - flomax will see urology as outpt   Hyperglycemia - diet eduction follow up with PCP  Post op HTN - secondary to stress  HTN (hypertension) - cont meds with parameters avoid   Hyperlipemia  CAD (coronary artery disease) CABG x3- 2015 - cont metoprolol, isosorbide, Asa

## 2022-10-06 NOTE — OCCUPATIONAL THERAPY INITIAL EVALUATION ADULT - DIAGNOSIS, OT EVAL
67 year old Male s/p anterior cervical discectomy fusion C3-C7 for cervical stenosis with myelopathy and RUE radiculitis, cervical radiculopathy (10/5)

## 2022-10-06 NOTE — OCCUPATIONAL THERAPY INITIAL EVALUATION ADULT - PRECAUTIONS/LIMITATIONS, REHAB EVAL
C-collar with OOB is for comfort and not mandatory, never to be worn in bed, with eating or hygeine but should be worn when in a motor vehicle x 28 days post op./fall precautions/spinal precautions/surgical precautions

## 2022-10-06 NOTE — OCCUPATIONAL THERAPY INITIAL EVALUATION ADULT - EATING, PREVIOUS LEVEL OF FUNCTION, OT EVAL
TAP Procedure Note    Pre-Procedure   Staff -        Anesthesiologist:  Debbie Thayer MD       Performed By: anesthesiologist       Location: OR       Procedure Start/Stop Times: 2/28/2022 1:26 PM and 2/28/2022 1:30 PM       Pre-Anesthestic Checklist: patient identified, IV checked, site marked, risks and benefits discussed, informed consent, monitors and equipment checked, pre-op evaluation, at physician/surgeon's request and post-op pain management  Timeout:       Correct Patient: Yes        Correct Procedure: Yes        Correct Site: Yes        Correct Position: Yes        Correct Laterality: Yes        Site Marked: Yes  Procedure Documentation  Procedure: TAP       Laterality: bilateral       Patient Position: supine       Patient Prep/Sterile Barriers: sterile gloves, mask       Skin prep: Chloraprep       Needle Gauge: 20.        Needle Length (Inches): 4        Ultrasound guided       1. Ultrasound was used to identify targeted nerve, plexus, vascular marker, or fascial plane and place a needle adjacent to it in real-time.       2. Ultrasound was used to visualize the spread of anesthetic in close proximity to the above referenced structure.       3. A permanent image is entered into the patient's record.       4. The visualized anatomic structures appeared normal.       5. There were no apparent abnormal pathologic findings.    Assessment/Narrative         The placement was negative for: blood aspirated and painful injection       Paresthesias: No.     Bolus given via needle..        Secured via.        Insertion/Infusion Method: Single Shot       Complications: none           independent

## 2022-10-06 NOTE — OCCUPATIONAL THERAPY INITIAL EVALUATION ADULT - OTHER, REHAB EVAL
Reports carpal tunnel to R wrist, s/p surgery but continued to have numbness/tingling to the first 3 fingers. Described pain as sharp, burning and ache.

## 2022-10-06 NOTE — OCCUPATIONAL THERAPY INITIAL EVALUATION ADULT - VISUAL ACUITY
No complaints in vision offered.Pt able to correctly identify # of digits held in central and peripheral fields bilaterally

## 2022-10-06 NOTE — OCCUPATIONAL THERAPY INITIAL EVALUATION ADULT - RANGE OF MOTION EXAMINATION, UPPER EXTREMITY
bilateral UE shoulder flexion only assessed to 90 degrees; all ROM assessed within spinal precautions/bilateral UE Active ROM was WFL  (within functional limits)

## 2022-10-07 ENCOUNTER — NON-APPOINTMENT (OUTPATIENT)
Age: 67
End: 2022-10-07

## 2022-10-07 ENCOUNTER — TRANSCRIPTION ENCOUNTER (OUTPATIENT)
Age: 67
End: 2022-10-07

## 2022-10-07 VITALS
OXYGEN SATURATION: 98 % | SYSTOLIC BLOOD PRESSURE: 154 MMHG | RESPIRATION RATE: 17 BRPM | DIASTOLIC BLOOD PRESSURE: 82 MMHG | TEMPERATURE: 98 F | HEART RATE: 63 BPM

## 2022-10-07 RX ORDER — TAMSULOSIN HYDROCHLORIDE 0.4 MG/1
1 CAPSULE ORAL
Qty: 90 | Refills: 0
Start: 2022-10-07

## 2022-10-07 RX ADMIN — PANTOPRAZOLE SODIUM 40 MILLIGRAM(S): 20 TABLET, DELAYED RELEASE ORAL at 05:48

## 2022-10-07 RX ADMIN — LISINOPRIL 10 MILLIGRAM(S): 2.5 TABLET ORAL at 05:47

## 2022-10-07 RX ADMIN — Medication 975 MILLIGRAM(S): at 06:45

## 2022-10-07 RX ADMIN — OXYCODONE HYDROCHLORIDE 5 MILLIGRAM(S): 5 TABLET ORAL at 02:12

## 2022-10-07 RX ADMIN — Medication 25 MILLIGRAM(S): at 05:48

## 2022-10-07 RX ADMIN — RANOLAZINE 500 MILLIGRAM(S): 500 TABLET, FILM COATED, EXTENDED RELEASE ORAL at 05:47

## 2022-10-07 RX ADMIN — CELECOXIB 200 MILLIGRAM(S): 200 CAPSULE ORAL at 06:45

## 2022-10-07 RX ADMIN — METHOCARBAMOL 750 MILLIGRAM(S): 500 TABLET, FILM COATED ORAL at 05:46

## 2022-10-07 RX ADMIN — Medication 975 MILLIGRAM(S): at 05:45

## 2022-10-07 RX ADMIN — OXYCODONE HYDROCHLORIDE 5 MILLIGRAM(S): 5 TABLET ORAL at 03:10

## 2022-10-07 RX ADMIN — CELECOXIB 200 MILLIGRAM(S): 200 CAPSULE ORAL at 05:46

## 2022-10-07 NOTE — SBIRT NOTE ADULT - NSSBIRTALCPOSREINDET_GEN_A_CORE
Met with patient who endorses drinking beer only on occasion.  He will have a beer rarely, never drinks hard liquor.  Positive reinforcement and support provided.

## 2022-10-07 NOTE — SBIRT NOTE ADULT - NSSBIRTDRGNOACTINTDET_GEN_A_CORE
Patient reports he will rarely smoke marijuana for help falling asleep.  He only uses a small amount and on rare occasions.

## 2022-10-07 NOTE — PROGRESS NOTE ADULT - SUBJECTIVE AND OBJECTIVE BOX
MINH GOETZ    [id number]    History: 68 yo M status post ACDF C3-7 POD # 2.  Patient is doing well and is comfortable. The patient's pain is controlled using the prescribed pain medications. Denies nausea, vomiting, chest pain, shortness of breath, abdominal pain or fever. No new complaints. Drain pulled yesterday.         Vital Signs Last 24 Hrs  T(C): 36.6 (07 Oct 2022 04:35), Max: 36.9 (06 Oct 2022 11:16)  T(F): 97.9 (07 Oct 2022 04:35), Max: 98.5 (06 Oct 2022 11:16)  HR: 64 (07 Oct 2022 04:35) (61 - 77)  BP: 162/83 (07 Oct 2022 04:35) (155/77 - 162/83)  BP(mean): --  RR: 18 (07 Oct 2022 04:35) (18 - 18)  SpO2: 95% (07 Oct 2022 04:35) (93% - 99%)    Parameters below as of 07 Oct 2022 04:35  Patient On (Oxygen Delivery Method): room air      Appearance: Alert, responsive, in no acute distress.  Skin: no rash on visible skin. Skin is clean, dry and intact. No bleeding. No abrasions. No ulcerations.  Musculoskeletal:         cervical: dressing clean, dry, intact.      Left Upper Extremity:  Sensation is grossly intact to light touch. 2+ distal pulses. Cap refill < 2 sec.         Right Upper Extremity: Sensation is grossly intact to light touch. Reports baseline paresthesia in digits 2 and 3. 2+ distal pulses. Cap refill < 2 sec.     Neurological: Sensation is grossly intact to light touch. No focal deficits or weaknesses found.    Motor exam: [  ]          [ ] Upper extremity                     Bi(c5)  WE(c6)  EE(c7)   FF(c8)                                                R         5/5        5/5        5/5       5/5                                               L          5/5        5/5        5/5       5/5    Primary Orthopedic Assessment:  •  68 yo M status post ACDF C3-7 POD # 2.    Plan:   • DVT prophylaxis as prescribed, including use of compression devices and ankle pumps  • Continue physical therapy  • Out of Bed as tolerated with assistance of a walker  • Incentive spirometry encouraged  • Pain control as clinically indicated  • Discharge planning – anticipated discharge is Home when cleared by PT/med

## 2022-10-07 NOTE — DISCHARGE NOTE NURSING/CASE MANAGEMENT/SOCIAL WORK - NSDCPEFALRISK_GEN_ALL_CORE
For information on Fall & Injury Prevention, visit: [email or web address] OR  [email or web address] OR  https://www.cdc.gov/steadi/patient.html

## 2022-10-11 PROBLEM — M47.812 SPONDYLOSIS WITHOUT MYELOPATHY OR RADICULOPATHY, CERVICAL REGION: Chronic | Status: ACTIVE | Noted: 2022-09-15

## 2022-10-11 PROBLEM — Z91.89 OTHER SPECIFIED PERSONAL RISK FACTORS, NOT ELSEWHERE CLASSIFIED: Chronic | Status: ACTIVE | Noted: 2022-09-15

## 2022-10-12 ENCOUNTER — APPOINTMENT (OUTPATIENT)
Dept: FAMILY MEDICINE | Facility: CLINIC | Age: 67
End: 2022-10-12

## 2022-10-12 VITALS
WEIGHT: 189 LBS | SYSTOLIC BLOOD PRESSURE: 148 MMHG | DIASTOLIC BLOOD PRESSURE: 72 MMHG | HEART RATE: 94 BPM | TEMPERATURE: 97.3 F | OXYGEN SATURATION: 98 % | HEIGHT: 65.5 IN | BODY MASS INDEX: 31.11 KG/M2

## 2022-10-12 DIAGNOSIS — R33.9 RETENTION OF URINE, UNSPECIFIED: ICD-10-CM

## 2022-10-12 DIAGNOSIS — N40.0 BENIGN PROSTATIC HYPERPLASIA WITHOUT LOWER URINARY TRACT SYMPMS: ICD-10-CM

## 2022-10-12 PROCEDURE — 99213 OFFICE O/P EST LOW 20 MIN: CPT

## 2022-10-12 NOTE — HISTORY OF PRESENT ILLNESS
[FreeTextEntry8] : s/p cervical surgery developed urinary retention post op neeeds urology referral

## 2022-10-14 ENCOUNTER — APPOINTMENT (OUTPATIENT)
Dept: ORTHOPEDIC SURGERY | Facility: CLINIC | Age: 67
End: 2022-10-14

## 2022-10-14 PROCEDURE — 72040 X-RAY EXAM NECK SPINE 2-3 VW: CPT

## 2022-10-14 PROCEDURE — 99024 POSTOP FOLLOW-UP VISIT: CPT

## 2022-10-14 NOTE — HISTORY OF PRESENT ILLNESS
[5] : the patient reports pain that is 5/10 in severity [Doing Well] : is doing well [No Sign of Infection] : is showing no signs of infection [Adequate Pain Control] : has adequate pain control [Chills] : no chills [Constipation] : no constipation [Diarrhea] : no diarrhea [Dysuria] : no dysuria [Fever] : no fever [Nausea] : no nausea [Vomiting] : no vomiting [de-identified] : Anterior cervical diskectomy and osteophytectomy, C3-C4, C4-C5, C5-C6, C6-C7.\par  Placement of interbody biomechanical graft with arthrodesis, C3-C4, C4-C5, C5-C6, C6-C7.\par  Placement of non-integral anterior instrumentation across four motion segments from C3 through C7.\par DOS: 10/05/2022 [de-identified] : 1 week status post 4 level ACDF.  He is having posterior cervical pain which is constant.  It does get relieved by muscle relaxers.  He does not like to take oxycodone.  He is having significant trouble swallowing solids or liquids and feels that he has difficulty breathing when laying flat, is woken up from sleep because of it.  Arms and legs are without numbness and tingling or weakness.  Denies constitutional symptoms. [de-identified] : CONSTITUTIONAL:  Patient is a very pleasant individual who is well-nourished and appears stated age.  He is nontoxic in appearance.  Accompanied by his wife.  He demonstrates how he swallows which is effective yet appears to have discomfort.\par PSYCHIATRIC:  Alert and oriented times three and in no apparent distress, and participates with orthopedic evaluation well.\par HEAD:  Atraumatic and  nonsyndromic in appearance.\par EENT: No thyromegaly, EOMI. no swelling about the neck.  No dysphonia\par RESPIRATORY:  Respiratory rate is regular, not dyspneic on examination.  \par LYMPHATICS:  There is no cervical or axillary lymphadenopathy.\par INTEGUMENTARY:  Skin is clean, dry, and intact about the bilateral upper extremities and cervical spine..  Cervical incision is clean dry intact and Steri-Strips are in place.\par VASCULAR:   There is brisk capillary refill about the bilateral upper extremities and radial pulses are 2/4. \par NEUROLOGIC:  Negative L'hirmitte, negative Spurling's sign. There are no pathologic reflexes. There is no decrease in sensation of the bilateral upper extremities on Wartenberg pinwheel examination.  Deep tendon reflexes are well-maintained at +2/4 of the bilateral upper extremities and are symmetric.\par MUSCULOSKELETAL:  There is no visible muscular atrophy.  Manual motor strength is well maintained in the bilateral upper extremities.  Cervical range of motion is well maintained.  The patient ambulates in a non-myelopathic manner. Normal secondary orthopaedic exam of bilateral shoulders, elbows and hands.  Elbow flexion and extension, wrist extension, finger flexion and abduction are well maintained.\par There are some posterior cervical myositis and decreased range of motion due to pain in flexion and extension which I think is reasonable\par   [de-identified] : Patient's x-rays from today's date show well-positioned status post 3 level ACDF lordosis is much improved compared to preop [de-identified] : Status post 4 level ACDF [de-identified] : He can drive approximately 1 month postop as long as he is off of oxycodone and methocarbamol for greater than 8 hours in a row.  Medrol Dosepak was prescribed for anti-inflammatory properties he will begin it today.  Gargling with warm salty water, ice to the affected area, cough lozenges, soft diet, cutting solids very small and chasing them with viscous fluids were all recommended and patient was also occurs that this is common and will get better with time most likely.  Acetaminophen 500 mg 2 tablets every 8 hours baseline for 2 weeks and then can add oxycodone 5 mg 4 times daily as needed severe pain.  Methocarbamol 750 mg p.o. 3 times daily as needed spasm.  Heat, ice, topicals.   Use cervical collar while being driven in a motor vehicle for the next 3 weeks until next visit.  Incisional care was discussed at length.  He is able to take a normal shower at this time.  Follow-up in 3 weeks or as needed exacerbation of symptoms.  That time we will obtain an AP lateral x-ray of the cervical spine

## 2022-10-17 RX ORDER — METHOCARBAMOL 750 MG/1
750 TABLET, FILM COATED ORAL 3 TIMES DAILY
Qty: 21 | Refills: 0 | Status: ACTIVE | COMMUNITY
Start: 2022-10-17 | End: 1900-01-01

## 2022-10-17 RX ORDER — OXYCODONE 5 MG/1
5 TABLET ORAL EVERY 6 HOURS
Qty: 21 | Refills: 0 | Status: ACTIVE | COMMUNITY
Start: 2022-10-17 | End: 1900-01-01

## 2022-10-20 PROCEDURE — 86850 RBC ANTIBODY SCREEN: CPT

## 2022-10-20 PROCEDURE — 76000 FLUOROSCOPY <1 HR PHYS/QHP: CPT

## 2022-10-20 PROCEDURE — 97116 GAIT TRAINING THERAPY: CPT

## 2022-10-20 PROCEDURE — 86900 BLOOD TYPING SEROLOGIC ABO: CPT

## 2022-10-20 PROCEDURE — 80048 BASIC METABOLIC PNL TOTAL CA: CPT

## 2022-10-20 PROCEDURE — C1889: CPT

## 2022-10-20 PROCEDURE — 86901 BLOOD TYPING SEROLOGIC RH(D): CPT

## 2022-10-20 PROCEDURE — C1713: CPT

## 2022-10-20 PROCEDURE — 82962 GLUCOSE BLOOD TEST: CPT

## 2022-10-20 PROCEDURE — 36415 COLL VENOUS BLD VENIPUNCTURE: CPT

## 2022-10-20 PROCEDURE — 97110 THERAPEUTIC EXERCISES: CPT

## 2022-10-25 RX ORDER — METHOCARBAMOL 750 MG/1
750 TABLET, FILM COATED ORAL 3 TIMES DAILY
Qty: 21 | Refills: 0 | Status: ACTIVE | COMMUNITY
Start: 2022-10-25 | End: 1900-01-01

## 2022-10-25 RX ORDER — KETOROLAC TROMETHAMINE 10 MG/1
10 TABLET, FILM COATED ORAL 3 TIMES DAILY
Qty: 15 | Refills: 0 | Status: ACTIVE | COMMUNITY
Start: 2022-10-25 | End: 1900-01-01

## 2022-10-25 RX ORDER — OXYCODONE 5 MG/1
5 TABLET ORAL
Qty: 21 | Refills: 0 | Status: ACTIVE | COMMUNITY
Start: 2022-10-25 | End: 1900-01-01

## 2022-11-04 ENCOUNTER — APPOINTMENT (OUTPATIENT)
Dept: ORTHOPEDIC SURGERY | Facility: CLINIC | Age: 67
End: 2022-11-04

## 2022-11-04 VITALS
DIASTOLIC BLOOD PRESSURE: 72 MMHG | BODY MASS INDEX: 31.11 KG/M2 | WEIGHT: 189 LBS | HEIGHT: 65.5 IN | SYSTOLIC BLOOD PRESSURE: 139 MMHG | HEART RATE: 67 BPM

## 2022-11-04 PROCEDURE — 99024 POSTOP FOLLOW-UP VISIT: CPT

## 2022-11-04 PROCEDURE — 72040 X-RAY EXAM NECK SPINE 2-3 VW: CPT

## 2022-11-04 NOTE — DISCUSSION/SUMMARY
[de-identified] : Patient can wean off the cervical collar he is leaving for [place] in approximately 10 days.  Aerobic conditioning light upper extremity strength training.  Patient will follow-up with us over late December when he is home for the holidays from [place] then returns in June.

## 2022-11-04 NOTE — HISTORY OF PRESENT ILLNESS
[de-identified] : Is 1 month status post 4 level ACDF he states that his pain is significantly improved his arms feel much better.  States his swallowing is also improving [Improving] : improving [0] : a current pain level of 0/10 [Intermit.] : ~He/She~ states the symptoms seem to be intermittent [None] : No exacerbating factors are noted [Rest] : relieved by rest [Ataxia] : no ataxia [Incontinence] : no incontinence [Loss of Dexterity] : good dexterity [Urinary Ret.] : no urinary retention

## 2022-11-04 NOTE — PHYSICAL EXAM
[de-identified] : No acute sensory and/or motor deficits incision is well-healed no dysphonia no visible dysphagia [de-identified] : Trays of the cervical spine are taken on today's date show stable 4 level ACDF

## 2022-11-16 NOTE — H&P PST ADULT - ASSESSMENT
63 y.o male scheduled for r Laparoscopic possible open right inguinal hernia repair   Plan  1. Stop all NSAIDS, herbal supplements and vitamins for 7 days.  2. NPO at midnight.  3. Take the following medications Metoprolol, Ramipril, Isosorbide with small sips of water on the morning of your procedure/surgery.  4. Use EZ sponges as directed  5. Use mupirocin as directed  6. Labs, EKG, CXR as per surgeon  7. Cardiologist Dr Sanchez clearance for optimization prior to surgery as per surgeon. Oculoplastic Surgeon Procedure Text (F): After obtaining clear surgical margins the patient was sent to oculoplastics for surgical repair.  The patient understands they will receive post-surgical care and follow-up from the referring physician's office. 74 y/o female with PMH of CAD, Afib s/p pacemaker  HLD, HTN, basal cell carcinoma, intraductal papillary mucinous neoplasm, lung ca s/p resection of left lung  scheduled for watchman/MICHAEL with Dr. Rika Gong 2022.    - Medical evaluation pending   -Cardiac evaluation pending  -Patient educated on ERP protocol (written/verbal)- verbalized understanding  -Educated on NSAIDS, multivitamins and herbals that increase the risk of bleeding and need to be stopped 7 days before procedure  -Educated on infection prevention  -Covid testing 3-5 days prior to surgery   -MRSA/MSSA swabbed in PST-Results pending  -Tylenol can be taken 1 week before surgery if needed for pain  -ASA will be held as per cardiologist's instruction  -Verbalized understanding of all instructions    OPIOID RISK TOOL    MELVIN EACH BOX THAT APPLIES AND ADD TOTALS AT THE END    FAMILY HISTORY OF SUBSTANCE ABUSE                 FEMALE         MALE                                                Alcohol                             [  ]1 pt          [  ]3pts                                               Illegal Durgs                     [  ]2 pts        [  ]3pts                                               Rx Drugs                           [  ]4 pts        [  ]4 pts    PERSONAL HISTORY OF SUBSTANCE ABUSE                                                                                          Alcohol                             [  ]3 pts       [  ]3 pts                                               Illegal Drugs                     [  ]4 pts        [  ]4 pts                                               Rx Drugs                           [  ]5 pts        [  ]5 pts    AGE BETWEEN 16-45 YEARS                                      [  ]1 pt         [  ]1 pt    HISTORY OF PREADOLESCENT   SEXUAL ABUSE                                                             [  ]3 pts        [  ]0pts    PSYCHOLOGICAL DISEASE                     ADD, OCD, Bipolar, Schizophrenia        [  ]2 pts         [  ]2 pts                      Depression                                               [  ]1 pt           [  ]1 pt           SCORING TOTAL   (add numbers and type here)              (0)                                     A score of 3 or lower indicated LOW risk for future opioid abuse  A score of 4 to 7 indicated moderate risk for future opioid abuse  A score of 8 or higher indicates a high risk for opioid abuse    CAPRINI SCORE [CLOT]    AGE RELATED RISK FACTORS                                                       MOBILITY RELATED FACTORS  [ ] Age 41-60 years                                            (1 Point)                  [ ] Bed rest                                                        (1 Point)  [x] Age: 61-74 years                                           (2 Points)                 [ ] Plaster cast                                                   (2 Points)  [ ] Age= 75 years                                              (3 Points)                 [ ] Bed bound for more than 72 hours                 (2 Points)    DISEASE RELATED RISK FACTORS                                               GENDER SPECIFIC FACTORS  [ ] Edema in the lower extremities                       (1 Point)                  [ ] Pregnancy                                                     (1 Point)  [ ] Varicose veins                                               (1 Point)                  [ ] Post-partum < 6 weeks                                   (1 Point)             [ x] BMI > 25 Kg/m2                                            (1 Point)                  [ ] Hormonal therapy  or oral contraception          (1 Point)                 [ ] Sepsis (in the previous month)                        (1 Point)                  [ ] History of pregnancy complications                 (1 point)  [ ] Pneumonia or serious lung disease                                               [ ] Unexplained or recurrent                     (1 Point)           (in the previous month)                               (1 Point)  [ ] Abnormal pulmonary function test                     (1 Point)                 SURGERY RELATED RISK FACTORS  [ ] Acute myocardial infarction                              (1 Point)                 [ ]  Section                                             (1 Point)  [ ] Congestive heart failure (in the previous month)  (1 Point)               [ ] Minor surgery                                                  (1 Point)   [ ] Inflammatory bowel disease                             (1 Point)                 [ ] Arthroscopic surgery                                        (2 Points)  [ ] Central venous access                                      (2 Points)                [ x] General surgery lasting more than 45 minutes   (2 Points)       [ ] Stroke (in the previous month)                          (5 Points)               [ ] Elective arthroplasty                                         (5 Points)                                                                                                                                               HEMATOLOGY RELATED FACTORS                                                 TRAUMA RELATED RISK FACTORS  [ ] Prior episodes of VTE                                     (3 Points)                [ ] Fracture of the hip, pelvis, or leg                       (5 Points)  [ ] Positive family history for VTE                         (3 Points)                 [ ] Acute spinal cord injury (in the previous month)  (5 Points)  [ ] Prothrombin 46282 A                                     (3 Points)                 [ ] Paralysis  (less than 1 month)                             (5 Points)  [ ] Factor V Leiden                                             (3 Points)                  [ ] Multiple Trauma within 1 month                        (5 Points)  [ ] Lupus anticoagulants                                     (3 Points)                                                           [ ] Anticardiolipin antibodies                               (3 Points)                                                       [ ] High homocysteine in the blood                      (3 Points)                                             [ ] Other congenital or acquired thrombophilia      (3 Points)                                                [ ] Heparin induced thrombocytopenia                  (3 Points)                                          Total Score [      5    ]    Caprini Score 0 - 2:  Low Risk, No VTE Prophylaxis required for most patients, encourage ambulation  Caprini Score 3 - 6:  At Risk, pharmacologic VTE prophylaxis is indicated for most patients (in the absence of a contraindication)  Caprini Score Greater than or = 7:  High Risk, pharmacologic VTE prophylaxis is indicated for most patients (in the absence of a contraindication)   68 y/o male with PMH CAD s/p CABGx3 , HTN, HLD scheduled for Anterior cervical discectomy fusion C3-4 C6-7 with Dr. Matthews on 10/5/2022.     - Medical evaluation pending   -Cardiac evaluation pending  -Patient educated on ERP protocol (written/verbal)- verbalized understanding  -Educated on NSAIDS, multivitamins and herbals that increase the risk of bleeding and need to be stopped 7 days before procedure  -Educated on infection prevention  -Covid testing 3-5 days prior to surgery   -MRSA/MSSA swabbed in PST-Results pending  -Tylenol can be taken 1 week before surgery if needed for pain  -ASA will be held as per cardiologist's instruction  -Verbalized understanding of all instructions    OPIOID RISK TOOL    MELVIN EACH BOX THAT APPLIES AND ADD TOTALS AT THE END    FAMILY HISTORY OF SUBSTANCE ABUSE                 FEMALE         MALE                                                Alcohol                             [  ]1 pt          [  ]3pts                                               Illegal Durgs                     [  ]2 pts        [  ]3pts                                               Rx Drugs                           [  ]4 pts        [  ]4 pts    PERSONAL HISTORY OF SUBSTANCE ABUSE                                                                                          Alcohol                             [  ]3 pts       [  ]3 pts                                               Illegal Drugs                     [  ]4 pts        [  ]4 pts                                               Rx Drugs                           [  ]5 pts        [  ]5 pts    AGE BETWEEN 16-45 YEARS                                      [  ]1 pt         [  ]1 pt    HISTORY OF PREADOLESCENT   SEXUAL ABUSE                                                             [  ]3 pts        [  ]0pts    PSYCHOLOGICAL DISEASE                     ADD, OCD, Bipolar, Schizophrenia        [  ]2 pts         [  ]2 pts                      Depression                                               [  ]1 pt           [  ]1 pt           SCORING TOTAL   (add numbers and type here)              (0)                                     A score of 3 or lower indicated LOW risk for future opioid abuse  A score of 4 to 7 indicated moderate risk for future opioid abuse  A score of 8 or higher indicates a high risk for opioid abuse    CAPRINI SCORE [CLOT]    AGE RELATED RISK FACTORS                                                       MOBILITY RELATED FACTORS  [ ] Age 41-60 years                                            (1 Point)                  [ ] Bed rest                                                        (1 Point)  [x] Age: 61-74 years                                           (2 Points)                 [ ] Plaster cast                                                   (2 Points)  [ ] Age= 75 years                                              (3 Points)                 [ ] Bed bound for more than 72 hours                 (2 Points)    DISEASE RELATED RISK FACTORS                                               GENDER SPECIFIC FACTORS  [ ] Edema in the lower extremities                       (1 Point)                  [ ] Pregnancy                                                     (1 Point)  [ ] Varicose veins                                               (1 Point)                  [ ] Post-partum < 6 weeks                                   (1 Point)             [ x] BMI > 25 Kg/m2                                            (1 Point)                  [ ] Hormonal therapy  or oral contraception          (1 Point)                 [ ] Sepsis (in the previous month)                        (1 Point)                  [ ] History of pregnancy complications                 (1 point)  [ ] Pneumonia or serious lung disease                                               [ ] Unexplained or recurrent                     (1 Point)           (in the previous month)                               (1 Point)  [ ] Abnormal pulmonary function test                     (1 Point)                 SURGERY RELATED RISK FACTORS  [ ] Acute myocardial infarction                              (1 Point)                 [ ]  Section                                             (1 Point)  [ ] Congestive heart failure (in the previous month)  (1 Point)               [ ] Minor surgery                                                  (1 Point)   [ ] Inflammatory bowel disease                             (1 Point)                 [ ] Arthroscopic surgery                                        (2 Points)  [ ] Central venous access                                      (2 Points)                [ x] General surgery lasting more than 45 minutes   (2 Points)       [ ] Stroke (in the previous month)                          (5 Points)               [ ] Elective arthroplasty                                         (5 Points)                                                                                                                                               HEMATOLOGY RELATED FACTORS                                                 TRAUMA RELATED RISK FACTORS  [ ] Prior episodes of VTE                                     (3 Points)                [ ] Fracture of the hip, pelvis, or leg                       (5 Points)  [ ] Positive family history for VTE                         (3 Points)                 [ ] Acute spinal cord injury (in the previous month)  (5 Points)  [ ] Prothrombin 69537 A                                     (3 Points)                 [ ] Paralysis  (less than 1 month)                             (5 Points)  [ ] Factor V Leiden                                             (3 Points)                  [ ] Multiple Trauma within 1 month                        (5 Points)  [ ] Lupus anticoagulants                                     (3 Points)                                                           [ ] Anticardiolipin antibodies                               (3 Points)                                                       [ ] High homocysteine in the blood                      (3 Points)                                             [ ] Other congenital or acquired thrombophilia      (3 Points)                                                [ ] Heparin induced thrombocytopenia                  (3 Points)                                          Total Score [      5    ]    Caprini Score 0 - 2:  Low Risk, No VTE Prophylaxis required for most patients, encourage ambulation  Caprini Score 3 - 6:  At Risk, pharmacologic VTE prophylaxis is indicated for most patients (in the absence of a contraindication)  Caprini Score Greater than or = 7:  High Risk, pharmacologic VTE prophylaxis is indicated for most patients (in the absence of a contraindication)

## 2022-11-17 ENCOUNTER — APPOINTMENT (OUTPATIENT)
Dept: FAMILY MEDICINE | Facility: CLINIC | Age: 67
End: 2022-11-17

## 2022-11-17 VITALS
TEMPERATURE: 97.1 F | SYSTOLIC BLOOD PRESSURE: 130 MMHG | DIASTOLIC BLOOD PRESSURE: 74 MMHG | HEIGHT: 65 IN | HEART RATE: 87 BPM | BODY MASS INDEX: 31.16 KG/M2 | WEIGHT: 187 LBS | OXYGEN SATURATION: 98 %

## 2022-11-17 DIAGNOSIS — R05.3 CHRONIC COUGH: ICD-10-CM

## 2022-11-17 PROCEDURE — 99213 OFFICE O/P EST LOW 20 MIN: CPT

## 2022-11-17 RX ORDER — METHYLPREDNISOLONE 4 MG/1
4 TABLET ORAL
Qty: 1 | Refills: 1 | Status: ACTIVE | COMMUNITY
Start: 2022-10-14 | End: 1900-01-01

## 2022-11-17 RX ORDER — BENZONATATE 100 MG/1
100 CAPSULE ORAL
Qty: 30 | Refills: 0 | Status: ACTIVE | COMMUNITY
Start: 2022-11-17 | End: 1900-01-01

## 2022-11-17 NOTE — REVIEW OF SYSTEMS
[Earache] : no earache [Nasal Discharge] : no nasal discharge [Sore Throat] : sore throat [Cough] : cough [Negative] : Gastrointestinal

## 2022-11-17 NOTE — HISTORY OF PRESENT ILLNESS
[FreeTextEntry8] : PT presenting for sore throat and cough \par no fevers or chills \par still hjaving cough despite taking cough syrup

## 2022-12-30 ENCOUNTER — APPOINTMENT (OUTPATIENT)
Dept: FAMILY MEDICINE | Facility: CLINIC | Age: 67
End: 2022-12-30
Payer: MEDICARE

## 2022-12-30 ENCOUNTER — MED ADMIN CHARGE (OUTPATIENT)
Age: 67
End: 2022-12-30

## 2022-12-30 PROCEDURE — G0009: CPT

## 2022-12-30 PROCEDURE — 90677 PCV20 VACCINE IM: CPT

## 2023-02-17 ENCOUNTER — NON-APPOINTMENT (OUTPATIENT)
Age: 68
End: 2023-02-17

## 2023-05-02 NOTE — H&P PST ADULT - LYMPHATICS COMMENTS
infiltration. 3. Bulky leiomyomatous uterus measuring 12.3 cm in length. 4. Appendix not visible. No pericecal inflammatory change to suggest occult appendicitis. RECOMMENDATIONS: Careful clinical correlation and follow up recommended. Pathology: 6.2 cm left ovarian indeterminate cyst.BPR is not applicable for complex cysts. Provide F/U management at your discretion. Reference: JACR 2020 Feb;17(2):248-254     CTA ABDOMEN PELVIS W CONTRAST    Result Date: 4/30/2023  EXAMINATION: CTA OF THE CHEST; CTA OF THE ABDOMEN AND PELVIS WITH CONTRAST 4/30/2023 3:00 am: TECHNIQUE: CTA of the chest was performed before and after the administration of intravenous contrast.  Multiplanar reformatted images are provided for review. MIP images are provided for review. Automated exposure control, iterative reconstruction, and/or weight based adjustment of the mA/kV was utilized to reduce the radiation dose to as low as reasonably achievable.; CTA of the abdomen and pelvis was performed with the administration of intravenous contrast. Multiplanar reformatted images are provided for review. MIP images are provided for review. Automated exposure control, iterative reconstruction, and/or weight based adjustment of the mA/kV was utilized to reduce the radiation dose to as low as reasonably achievable. COMPARISON: None. HISTORY: ORDERING SYSTEM PROVIDED HISTORY: r/o dissection TECHNOLOGIST PROVIDED HISTORY: Reason for exam:->r/o dissection Decision Support Exception - unselect if not a suspected or confirmed emergency medical condition->Emergency Medical Condition (MA) What reading provider will be dictating this exam?->CRC FINDINGS: CTA CHEST: Thoracic aorta: No evidence of thoracic aortic aneurysm or dissection. No acute abnormality of the aorta. Supra-aortic trunks are widely patent. Mediastinum: No evidence of mediastinal lymphadenopathy. The heart and pericardium demonstrate no acute abnormality.   No pulmonary emboli detected with No cervical or supraclavicular adenopathy

## 2023-05-25 ENCOUNTER — APPOINTMENT (OUTPATIENT)
Dept: ORTHOPEDIC SURGERY | Facility: CLINIC | Age: 68
End: 2023-05-25
Payer: MEDICARE

## 2023-05-25 VITALS
TEMPERATURE: 98.1 F | SYSTOLIC BLOOD PRESSURE: 136 MMHG | WEIGHT: 185 LBS | HEART RATE: 76 BPM | HEIGHT: 65 IN | DIASTOLIC BLOOD PRESSURE: 83 MMHG | BODY MASS INDEX: 30.82 KG/M2

## 2023-05-25 DIAGNOSIS — G95.9 DISEASE OF SPINAL CORD, UNSPECIFIED: ICD-10-CM

## 2023-05-25 DIAGNOSIS — Z98.1 ARTHRODESIS STATUS: ICD-10-CM

## 2023-05-25 DIAGNOSIS — M54.12 DISEASE OF SPINAL CORD, UNSPECIFIED: ICD-10-CM

## 2023-05-25 PROCEDURE — 99213 OFFICE O/P EST LOW 20 MIN: CPT

## 2023-05-25 PROCEDURE — 72040 X-RAY EXAM NECK SPINE 2-3 VW: CPT

## 2023-05-25 NOTE — HISTORY OF PRESENT ILLNESS
[de-identified] : Patient is status post a multilevel/4 level ACDF in October 2022 presents in follow-up, overall patient has been doing very well from a radicular Manatt myelopathy standpoint strength is greatly returned complete resolution of shoulder pain.  Is here today for another flareup of cervical pain he is still utilizing his bone stimulator which is fantastic PATT questionnaire is negative, this improves with exercise topical modalities and anti-inflammatory [Improving] : improving [0] : a current pain level of 0/10 [Intermit.] : ~He/She~ states the symptoms seem to be intermittent [Bending] : not worsened by bending [Lifting] : worsened by lifting [Exercise Regimen] : relieved by exercise regimen [NSAIDs] : relieved by nonsteroidal anti-inflammatory drugs [Rest] : relieved by rest [Ataxia] : no ataxia [Incontinence] : no incontinence [Loss of Dexterity] : good dexterity [Urinary Ret.] : no urinary retention

## 2023-05-25 NOTE — DISCUSSION/SUMMARY
[de-identified] : We have discussed returning to aerobic conditioning walking home-based upper extremity strength training topical modalities anti-inflammatories even alternative measures such as massage therapy acupuncture.  Patient will follow-up in approximately 2 months after home exercises are not returned.  If he feels these are not helping patient can call for a physical therapy prescription.

## 2023-05-25 NOTE — PHYSICAL EXAM
[de-identified] : CONSTITUTIONAL:  Patient is a very pleasant individual who is well-nourished and appears stated age. \par PSYCHIATRIC:  Alert and oriented times three and in no apparent distress, and participates with orthopedic evaluation well.\par HEAD:  Atraumatic and  nonsyndromic in appearance.\par EENT: No thyromegaly, EOMI.\par RESPIRATORY:  Respiratory rate is regular, not dyspneic on examination.\par LYMPHATICS:  There is no cervical or axillary lymphadenopathy.\par INTEGUMENTARY:  Skin is clean, dry, and intact about the bilateral upper extremities and cervical spine.  Incision is well-healed\par VASCULAR:   There is brisk capillary refill about the bilateral upper extremities and radial pulses are 2/4. \par NEUROLOGIC:  Negative L'hirmitte, negative Spurling's sign. There are no pathologic reflexes. There is no decrease in sensation of the bilateral upper extremities on Wartenberg pinwheel examination.  Deep tendon reflexes are well-maintained at +2/4 of the bilateral upper extremities and are symmetric.\par MUSCULOSKELETAL:  There is no visible muscular atrophy.  Manual motor strength is well maintained in the bilateral upper extremities.  Cervical range of motion is well maintained.  The patient ambulates in a non-myelopathic manner. Normal secondary orthopaedic exam of bilateral shoulders, elbows and hands.  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  Overall implants are well aligned with no acute change

## 2023-12-07 ENCOUNTER — APPOINTMENT (RX ONLY)
Dept: URBAN - METROPOLITAN AREA CLINIC 116 | Facility: CLINIC | Age: 68
Setting detail: DERMATOLOGY
End: 2023-12-07

## 2023-12-07 DIAGNOSIS — L738 OTHER SPECIFIED DISEASES OF HAIR AND HAIR FOLLICLES: ICD-10-CM

## 2023-12-07 DIAGNOSIS — L663 OTHER SPECIFIED DISEASES OF HAIR AND HAIR FOLLICLES: ICD-10-CM

## 2023-12-07 DIAGNOSIS — D18.0 HEMANGIOMA: ICD-10-CM

## 2023-12-07 DIAGNOSIS — Z71.89 OTHER SPECIFIED COUNSELING: ICD-10-CM

## 2023-12-07 DIAGNOSIS — L81.4 OTHER MELANIN HYPERPIGMENTATION: ICD-10-CM

## 2023-12-07 DIAGNOSIS — D22 MELANOCYTIC NEVI: ICD-10-CM

## 2023-12-07 DIAGNOSIS — L73.9 FOLLICULAR DISORDER, UNSPECIFIED: ICD-10-CM

## 2023-12-07 DIAGNOSIS — L82.1 OTHER SEBORRHEIC KERATOSIS: ICD-10-CM

## 2023-12-07 PROBLEM — L02.32 FURUNCLE OF BUTTOCK: Status: ACTIVE | Noted: 2023-12-07

## 2023-12-07 PROBLEM — D18.01 HEMANGIOMA OF SKIN AND SUBCUTANEOUS TISSUE: Status: ACTIVE | Noted: 2023-12-07

## 2023-12-07 PROBLEM — D23.71 OTHER BENIGN NEOPLASM OF SKIN OF RIGHT LOWER LIMB, INCLUDING HIP: Status: ACTIVE | Noted: 2023-12-07

## 2023-12-07 PROBLEM — D22.5 MELANOCYTIC NEVI OF TRUNK: Status: ACTIVE | Noted: 2023-12-07

## 2023-12-07 PROCEDURE — ? COUNSELING

## 2023-12-07 PROCEDURE — 99204 OFFICE O/P NEW MOD 45 MIN: CPT

## 2023-12-07 PROCEDURE — ? PRESCRIPTION

## 2023-12-07 PROCEDURE — ? PRESCRIPTION MEDICATION MANAGEMENT

## 2023-12-07 PROCEDURE — ? MEDICATION COUNSELING

## 2023-12-07 RX ORDER — CLINDAMYCIN PHOSPHATE 10 MG/ML
SOLUTION TOPICAL QD
Qty: 60 | Refills: 2 | Status: ERX | COMMUNITY
Start: 2023-12-07

## 2023-12-07 RX ADMIN — CLINDAMYCIN PHOSPHATE: 10 SOLUTION TOPICAL at 00:00

## 2023-12-07 ASSESSMENT — LOCATION SIMPLE DESCRIPTION DERM
LOCATION SIMPLE: LEFT FOREARM
LOCATION SIMPLE: LEFT BUTTOCK
LOCATION SIMPLE: RIGHT UPPER BACK
LOCATION SIMPLE: LEFT PRETIBIAL REGION
LOCATION SIMPLE: LEFT UPPER BACK
LOCATION SIMPLE: RIGHT FOREARM
LOCATION SIMPLE: RIGHT PRETIBIAL REGION
LOCATION SIMPLE: RIGHT BUTTOCK

## 2023-12-07 ASSESSMENT — LOCATION DETAILED DESCRIPTION DERM
LOCATION DETAILED: LEFT PROXIMAL DORSAL FOREARM
LOCATION DETAILED: LEFT DISTAL PRETIBIAL REGION
LOCATION DETAILED: RIGHT BUTTOCK
LOCATION DETAILED: RIGHT DISTAL PRETIBIAL REGION
LOCATION DETAILED: RIGHT INFERIOR UPPER BACK
LOCATION DETAILED: RIGHT PROXIMAL DORSAL FOREARM
LOCATION DETAILED: LEFT INFERIOR MEDIAL UPPER BACK
LOCATION DETAILED: RIGHT MEDIAL UPPER BACK
LOCATION DETAILED: LEFT BUTTOCK

## 2023-12-07 ASSESSMENT — LOCATION ZONE DERM
LOCATION ZONE: LEG
LOCATION ZONE: TRUNK
LOCATION ZONE: ARM

## 2023-12-07 NOTE — PROCEDURE: PRESCRIPTION MEDICATION MANAGEMENT
Initiate Treatment: Benzoyl Peroxide 10% daily\\nClindamycin
Render In Strict Bullet Format?: No
Detail Level: Zone

## 2023-12-07 NOTE — PROCEDURE: MEDICATION COUNSELING
Sotyktu Pregnancy And Lactation Text: There is insufficient data to evaluate whether or not Sotyktu is safe to use during pregnancy.   It is not known if Sotyktu passes into breast milk and whether or not it is safe to use when breastfeeding.  
VTAMA Counseling: I discussed with the patient that VTAMA is not for use in the eyes, mouth or mouth. They should call the office if they develop any signs of allergic reactions to VTAMA. The patient verbalized understanding of the proper use and possible adverse effects of VTAMA.  All of the patient's questions and concerns were addressed.
Finasteride Male Counseling: Finasteride Counseling:  I discussed with the patient the risks of use of finasteride including but not limited to decreased libido, decreased ejaculate volume, gynecomastia, and depression. Women should not handle medication.  All of the patient's questions and concerns were addressed.
Drysol Pregnancy And Lactation Text: This medication is considered safe during pregnancy and breast feeding.
Rifampin Pregnancy And Lactation Text: This medication is Pregnancy Category C and it isn't know if it is safe during pregnancy. It is also excreted in breast milk and should not be used if you are breast feeding.
Hydroxychloroquine Counseling:  I discussed with the patient that a baseline ophthalmologic exam is needed at the start of therapy and every year thereafter while on therapy. A CBC may also be warranted for monitoring.  The side effects of this medication were discussed with the patient, including but not limited to agranulocytosis, aplastic anemia, seizures, rashes, retinopathy, and liver toxicity. Patient instructed to call the office should any adverse effect occur.  The patient verbalized understanding of the proper use and possible adverse effects of Plaquenil.  All the patient's questions and concerns were addressed.
Skyrizi Counseling: I discussed with the patient the risks of risankizumab-rzaa including but not limited to immunosuppression, and serious infections.  The patient understands that monitoring is required including a PPD at baseline and must alert us or the primary physician if symptoms of infection or other concerning signs are noted.
Azithromycin Counseling:  I discussed with the patient the risks of azithromycin including but not limited to GI upset, allergic reaction, drug rash, diarrhea, and yeast infections.
Doxepin Pregnancy And Lactation Text: This medication is Pregnancy Category C and it isn't known if it is safe during pregnancy. It is also excreted in breast milk and breast feeding isn't recommended.
Clofazimine Pregnancy And Lactation Text: This medication is Pregnancy Category C and isn't considered safe during pregnancy. It is excreted in breast milk.
Itraconazole Counseling:  I discussed with the patient the risks of itraconazole including but not limited to liver damage, nausea/vomiting, neuropathy, and severe allergy.  The patient understands that this medication is best absorbed when taken with acidic beverages such as non-diet cola or ginger ale.  The patient understands that monitoring is required including baseline LFTs and repeat LFTs at intervals.  The patient understands that they are to contact us or the primary physician if concerning signs are noted.
Sski Pregnancy And Lactation Text: This medication is Pregnancy Category D and isn't considered safe during pregnancy. It is excreted in breast milk.
Adbry Pregnancy And Lactation Text: It is unknown if this medication will adversely affect pregnancy or breast feeding.
Benzoyl Peroxide Counseling: Patient counseled that medicine may cause skin irritation and bleach clothing.  In the event of skin irritation, the patient was advised to reduce the amount of the drug applied or use it less frequently.   The patient verbalized understanding of the proper use and possible adverse effects of benzoyl peroxide.  All of the patient's questions and concerns were addressed.
Protopic Counseling: Patient may experience a mild burning sensation during topical application. Protopic is not approved in children less than 2 years of age. There have been case reports of hematologic and skin malignancies in patients using topical calcineurin inhibitors although causality is questionable.
Topical Retinoid counseling:  Patient advised to apply a pea-sized amount only at bedtime and wait 30 minutes after washing their face before applying.  If too drying, patient may add a non-comedogenic moisturizer. The patient verbalized understanding of the proper use and possible adverse effects of retinoids.  All of the patient's questions and concerns were addressed.
Oral Minoxidil Counseling- I discussed with the patient the risks of oral minoxidil including but not limited to shortness of breath, swelling of the feet or ankles, dizziness, lightheadedness, unwanted hair growth and allergic reaction.  The patient verbalized understanding of the proper use and possible adverse effects of oral minoxidil.  All of the patient's questions and concerns were addressed.
Klisyri Pregnancy And Lactation Text: It is unknown if this medication can harm a developing fetus or if it is excreted in breast milk.
Cibinqo Counseling: I discussed with the patient the risks of Cibinqo therapy including but not limited to common cold, nausea, headache, cold sores, increased blood CPK levels, dizziness, UTIs, fatigue, acne, and vomitting. Live vaccines should be avoided.  This medication has been linked to serious infections; higher rate of mortality; malignancy and lymphoproliferative disorders; major adverse cardiovascular events; thrombosis; thrombocytopenia and lymphopenia; lipid elevations; and retinal detachment.
Cellcept Pregnancy And Lactation Text: This medication is Pregnancy Category D and isn't considered safe during pregnancy. It is unknown if this medication is excreted in breast milk.
Cimzia Counseling:  I discussed with the patient the risks of Cimzia including but not limited to immunosuppression, allergic reactions and infections.  The patient understands that monitoring is required including a PPD at baseline and must alert us or the primary physician if symptoms of infection or other concerning signs are noted.
Erythromycin Counseling:  I discussed with the patient the risks of erythromycin including but not limited to GI upset, allergic reaction, drug rash, diarrhea, increase in liver enzymes, and yeast infections.
Azithromycin Pregnancy And Lactation Text: This medication is considered safe during pregnancy and is also secreted in breast milk.
Humira Pregnancy And Lactation Text: This medication is Pregnancy Category B and is considered safe during pregnancy. It is unknown if this medication is excreted in breast milk.
Skyrizi Pregnancy And Lactation Text: The risk during pregnancy and breastfeeding is uncertain with this medication.
Topical Metronidazole Pregnancy And Lactation Text: This medication is Pregnancy Category B and considered safe during pregnancy.  It is also considered safe to use while breastfeeding.
Zoryve Pregnancy And Lactation Text: It is unknown if this medication can cause problems during pregnancy and breastfeeding.
Thalidomide Counseling: I discussed with the patient the risks of thalidomide including but not limited to birth defects, anxiety, weakness, chest pain, dizziness, cough and severe allergy.
Colchicine Counseling:  Patient counseled regarding adverse effects including but not limited to stomach upset (nausea, vomiting, stomach pain, or diarrhea).  Patient instructed to limit alcohol consumption while taking this medication.  Colchicine may reduce blood counts especially with prolonged use.  The patient understands that monitoring of kidney function and blood counts may be required, especially at baseline. The patient verbalized understanding of the proper use and possible adverse effects of colchicine.  All of the patient's questions and concerns were addressed.
Protopic Pregnancy And Lactation Text: This medication is Pregnancy Category C. It is unknown if this medication is excreted in breast milk when applied topically.
Finasteride Pregnancy And Lactation Text: This medication is absolutely contraindicated during pregnancy. It is unknown if it is excreted in breast milk.
Erivedge Counseling- I discussed with the patient the risks of Erivedge including but not limited to nausea, vomiting, diarrhea, constipation, weight loss, changes in the sense of taste, decreased appetite, muscle spasms, and hair loss.  The patient verbalized understanding of the proper use and possible adverse effects of Erivedge.  All of the patient's questions and concerns were addressed.
Topical Steroids Counseling: I discussed with the patient that prolonged use of topical steroids can result in the increased appearance of superficial blood vessels (telangiectasias), lightening (hypopigmentation) and thinning of the skin (atrophy).  Patient understands to avoid using high potency steroids in skin folds, the groin or the face.  The patient verbalized understanding of the proper use and possible adverse effects of topical steroids.  All of the patient's questions and concerns were addressed.
Elidel Counseling: Patient may experience a mild burning sensation during topical application. Elidel is not approved in children less than 2 years of age. There have been case reports of hematologic and skin malignancies in patients using topical calcineurin inhibitors although causality is questionable.
Sarecycline Counseling: Patient advised regarding possible photosensitivity and discoloration of the teeth, skin, lips, tongue and gums.  Patient instructed to avoid sunlight, if possible.  When exposed to sunlight, patients should wear protective clothing, sunglasses, and sunscreen.  The patient was instructed to call the office immediately if the following severe adverse effects occur:  hearing changes, easy bruising/bleeding, severe headache, or vision changes.  The patient verbalized understanding of the proper use and possible adverse effects of sarecycline.  All of the patient's questions and concerns were addressed.
Hydroxychloroquine Pregnancy And Lactation Text: This medication has been shown to cause fetal harm but it isn't assigned a Pregnancy Risk Category. There are small amounts excreted in breast milk.
Xeljanz Counseling: I discussed with the patient the risks of Xeljanz therapy including increased risk of infection, liver issues, headache, diarrhea, or cold symptoms. Live vaccines should be avoided. They were instructed to call if they have any problems.
Minoxidil Counseling: Minoxidil is a topical medication which can increase blood flow where it is applied. It is uncertain how this medication increases hair growth. Side effects are uncommon and include stinging and allergic reactions.
Cibinqo Pregnancy And Lactation Text: It is unknown if this medication will adversely affect pregnancy or breast feeding.  You should not take this medication if you are currently pregnant or planning a pregnancy or while breastfeeding.
Erythromycin Pregnancy And Lactation Text: This medication is Pregnancy Category B and is considered safe during pregnancy. It is also excreted in breast milk.
Benzoyl Peroxide Pregnancy And Lactation Text: This medication is Pregnancy Category C. It is unknown if benzoyl peroxide is excreted in breast milk.
Cyclophosphamide Counseling:  I discussed with the patient the risks of cyclophosphamide including but not limited to hair loss, hormonal abnormalities, decreased fertility, abdominal pain, diarrhea, nausea and vomiting, bone marrow suppression and infection. The patient understands that monitoring is required while taking this medication.
Hydroxyzine Counseling: Patient advised that the medication is sedating and not to drive a car after taking this medication.  Patient informed of potential adverse effects including but not limited to dry mouth, urinary retention, and blurry vision.  The patient verbalized understanding of the proper use and possible adverse effects of hydroxyzine.  All of the patient's questions and concerns were addressed.
Itraconazole Pregnancy And Lactation Text: This medication is Pregnancy Category C and it isn't know if it is safe during pregnancy. It is also excreted in breast milk.
Bactrim Counseling:  I discussed with the patient the risks of sulfa antibiotics including but not limited to GI upset, allergic reaction, drug rash, diarrhea, dizziness, photosensitivity, and yeast infections.  Rarely, more serious reactions can occur including but not limited to aplastic anemia, agranulocytosis, methemoglobinemia, blood dyscrasias, liver or kidney failure, lung infiltrates or desquamative/blistering drug rashes.
Cimzia Pregnancy And Lactation Text: This medication crosses the placenta but can be considered safe in certain situations. Cimzia may be excreted in breast milk.
Zyclara Counseling:  I discussed with the patient the risks of imiquimod including but not limited to erythema, scaling, itching, weeping, crusting, and pain.  Patient understands that the inflammatory response to imiquimod is variable from person to person and was educated regarded proper titration schedule.  If flu-like symptoms develop, patient knows to discontinue the medication and contact us.
Oral Minoxidil Pregnancy And Lactation Text: This medication should only be used when clearly needed if you are pregnant, attempting to become pregnant or breast feeding.
Low Dose Naltrexone Counseling- I discussed with the patient the potential risks and side effects of low dose naltrexone including but not limited to: more vivid dreams, headaches, nausea, vomiting, abdominal pain, fatigue, dizziness, and anxiety.
Qbrexza Counseling:  I discussed with the patient the risks of Qbrexza including but not limited to headache, mydriasis, blurred vision, dry eyes, nasal dryness, dry mouth, dry throat, dry skin, urinary hesitation, and constipation.  Local skin reactions including erythema, burning, stinging, and itching can also occur.
Birth Control Pills Counseling: Birth Control Pill Counseling: I discussed with the patient the potential side effects of OCPs including but not limited to increased risk of stroke, heart attack, thrombophlebitis, deep venous thrombosis, hepatic adenomas, breast changes, GI upset, headaches, and depression.  The patient verbalized understanding of the proper use and possible adverse effects of OCPs. All of the patient's questions and concerns were addressed.
Topical Retinoid Pregnancy And Lactation Text: This medication is Pregnancy Category C. It is unknown if this medication is excreted in breast milk.
Ilumya Counseling: I discussed with the patient the risks of tildrakizumab including but not limited to immunosuppression, malignancy, posterior leukoencephalopathy syndrome, and serious infections.  The patient understands that monitoring is required including a PPD at baseline and must alert us or the primary physician if symptoms of infection or other concerning signs are noted.
Stelara Counseling:  I discussed with the patient the risks of ustekinumab including but not limited to immunosuppression, malignancy, posterior leukoencephalopathy syndrome, and serious infections.  The patient understands that monitoring is required including a PPD at baseline and must alert us or the primary physician if symptoms of infection or other concerning signs are noted.
Xelsebz Pregnancy And Lactation Text: This medication is Pregnancy Category D and is not considered safe during pregnancy.  The risk during breast feeding is also uncertain.
Erivedge Pregnancy And Lactation Text: This medication is Pregnancy Category X and is absolutely contraindicated during pregnancy. It is unknown if it is excreted in breast milk.
Carac Counseling:  I discussed with the patient the risks of Carac including but not limited to erythema, scaling, itching, weeping, crusting, and pain.
Topical Steroids Applications Pregnancy And Lactation Text: Most topical steroids are considered safe to use during pregnancy and lactation.  Any topical steroid applied to the breast or nipple should be washed off before breastfeeding.
Litfulo Counseling: I discussed with the patient the risks of Litfulo therapy including but not limited to upper respiratory tract infections, shingles, cold sores, and nausea. Live vaccines should be avoided.  This medication has been linked to serious infections; higher rate of mortality; malignancy and lymphoproliferative disorders; major adverse cardiovascular events; thrombosis; gastrointestinal perforations; neutropenia; lymphopenia; anemia; liver enzyme elevations; and lipid elevations.
Metronidazole Counseling:  I discussed with the patient the risks of metronidazole including but not limited to seizures, nausea/vomiting, a metallic taste in the mouth, nausea/vomiting and severe allergy.
Hydroxyzine Pregnancy And Lactation Text: This medication is not safe during pregnancy and should not be taken. It is also excreted in breast milk and breast feeding isn't recommended.
Cyclophosphamide Pregnancy And Lactation Text: This medication is Pregnancy Category D and it isn't considered safe during pregnancy. This medication is excreted in breast milk.
Ketoconazole Counseling:   Patient counseled regarding improving absorption with orange juice.  Adverse effects include but are not limited to breast enlargement, headache, diarrhea, nausea, upset stomach, liver function test abnormalities, taste disturbance, and stomach pain.  There is a rare possibility of liver failure that can occur when taking ketoconazole. The patient understands that monitoring of LFTs may be required, especially at baseline. The patient verbalized understanding of the proper use and possible adverse effects of ketoconazole.  All of the patient's questions and concerns were addressed.
Sarecycline Pregnancy And Lactation Text: This medication is Pregnancy Category D and not consider safe during pregnancy. It is also excreted in breast milk.
Detail Level: Zone
Otezla Counseling: The side effects of Otezla were discussed with the patient, including but not limited to worsening or new depression, weight loss, diarrhea, nausea, upper respiratory tract infection, and headache. Patient instructed to call the office should any adverse effect occur.  The patient verbalized understanding of the proper use and possible adverse effects of Otezla.  All the patient's questions and concerns were addressed.
Minoxidil Pregnancy And Lactation Text: This medication has not been assigned a Pregnancy Risk Category but animal studies failed to show danger with the topical medication. It is unknown if the medication is excreted in breast milk.
High Dose Vitamin A Counseling: Side effects reviewed, pt to contact office should one occur.
Libtayo Counseling- I discussed with the patient the risks of Libtayo including but not limited to nausea, vomiting, diarrhea, and bone or muscle pain.  The patient verbalized understanding of the proper use and possible adverse effects of Libtayo.  All of the patient's questions and concerns were addressed.
Low Dose Naltrexone Pregnancy And Lactation Text: Naltrexone is pregnancy category C.  There have been no adequate and well-controlled studies in pregnant women.  It should be used in pregnancy only if the potential benefit justifies the potential risk to the fetus.   Limited data indicates that naltrexone is minimally excreted into breastmilk.
Eucrisa Counseling: Patient may experience a mild burning sensation during topical application. Eucrisa is not approved in children less than 3 months of age.
Cosentyx Counseling:  I discussed with the patient the risks of Cosentyx including but not limited to worsening of Crohn's disease, immunosuppression, allergic reactions and infections.  The patient understands that monitoring is required including a PPD at baseline and must alert us or the primary physician if symptoms of infection or other concerning signs are noted.
Bactrim Pregnancy And Lactation Text: This medication is Pregnancy Category D and is known to cause fetal risk.  It is also excreted in breast milk.
Cyclosporine Counseling:  I discussed with the patient the risks of cyclosporine including but not limited to hypertension, gingival hyperplasia,myelosuppression, immunosuppression, liver damage, kidney damage, neurotoxicity, lymphoma, and serious infections. The patient understands that monitoring is required including baseline blood pressure, CBC, CMP, lipid panel and uric acid, and then 1-2 times monthly CMP and blood pressure.
Ketoconazole Pregnancy And Lactation Text: This medication is Pregnancy Category C and it isn't know if it is safe during pregnancy. It is also excreted in breast milk and breast feeding isn't recommended.
Birth Control Pills Pregnancy And Lactation Text: This medication should be avoided if pregnant and for the first 30 days post-partum.
Rituxan Counseling:  I discussed with the patient the risks of Rituxan infusions. Side effects can include infusion reactions, severe drug rashes including mucocutaneous reactions, reactivation of latent hepatitis and other infections and rarely progressive multifocal leukoencephalopathy.  All of the patient's questions and concerns were addressed.
Tazorac Counseling:  Patient advised that medication is irritating and drying.  Patient may need to apply sparingly and wash off after an hour before eventually leaving it on overnight.  The patient verbalized understanding of the proper use and possible adverse effects of tazorac.  All of the patient's questions and concerns were addressed.
Tetracycline Counseling: Patient counseled regarding possible photosensitivity and increased risk for sunburn.  Patient instructed to avoid sunlight, if possible.  When exposed to sunlight, patients should wear protective clothing, sunglasses, and sunscreen.  The patient was instructed to call the office immediately if the following severe adverse effects occur:  hearing changes, easy bruising/bleeding, severe headache, or vision changes.  The patient verbalized understanding of the proper use and possible adverse effects of tetracycline.  All of the patient's questions and concerns were addressed. Patient understands to avoid pregnancy while on therapy due to potential birth defects.
Tranexamic Acid Counseling:  Patient advised of the small risk of bleeding problems with tranexamic acid. They were also instructed to call if they developed any nausea, vomiting or diarrhea. All of the patient's questions and concerns were addressed.
Qbrexza Pregnancy And Lactation Text: There is no available data on Qbrexza use in pregnant women.  There is no available data on Qbrexza use in lactation.
Otezla Pregnancy And Lactation Text: This medication is Pregnancy Category C and it isn't known if it is safe during pregnancy. It is unknown if it is excreted in breast milk.
Infliximab Counseling:  I discussed with the patient the risks of infliximab including but not limited to myelosuppression, immunosuppression, autoimmune hepatitis, demyelinating diseases, lymphoma, and serious infections.  The patient understands that monitoring is required including a PPD at baseline and must alert us or the primary physician if symptoms of infection or other concerning signs are noted.
Mirvaso Counseling: Mirvaso is a topical medication which can decrease superficial blood flow where applied. Side effects are uncommon and include stinging, redness and allergic reactions.
Tazorac Pregnancy And Lactation Text: This medication is not safe during pregnancy. It is unknown if this medication is excreted in breast milk.
Metronidazole Pregnancy And Lactation Text: This medication is Pregnancy Category B and considered safe during pregnancy.  It is also excreted in breast milk.
Litfulo Pregnancy And Lactation Text: Based on animal studies, Lifulo may cause embryo-fetal harm when administered to pregnant women.  The medication should not be used in pregnancy.  Breastfeeding is not recommended during treatment.
Dapsone Counseling: I discussed with the patient the risks of dapsone including but not limited to hemolytic anemia, agranulocytosis, rashes, methemoglobinemia, kidney failure, peripheral neuropathy, headaches, GI upset, and liver toxicity.  Patients who start dapsone require monitoring including baseline LFTs and weekly CBCs for the first month, then every month thereafter.  The patient verbalized understanding of the proper use and possible adverse effects of dapsone.  All of the patient's questions and concerns were addressed.
Carac Pregnancy And Lactation Text: This medication is Pregnancy Category X and contraindicated in pregnancy and in women who may become pregnant. It is unknown if this medication is excreted in breast milk.
Taltz Counseling: I discussed with the patient the risks of ixekizumab including but not limited to immunosuppression, serious infections, worsening of inflammatory bowel disease and drug reactions.  The patient understands that monitoring is required including a PPD at baseline and must alert us or the primary physician if symptoms of infection or other concerning signs are noted.
Albendazole Counseling:  I discussed with the patient the risks of albendazole including but not limited to cytopenia, kidney damage, nausea/vomiting and severe allergy.  The patient understands that this medication is being used in an off-label manner.
Topical Sulfur Applications Counseling: Topical Sulfur Counseling: Patient counseled that this medication may cause skin irritation or allergic reactions.  In the event of skin irritation, the patient was advised to reduce the amount of the drug applied or use it less frequently.   The patient verbalized understanding of the proper use and possible adverse effects of topical sulfur application.  All of the patient's questions and concerns were addressed.
Cephalexin Counseling: I counseled the patient regarding use of cephalexin as an antibiotic for prophylactic and/or therapeutic purposes. Cephalexin (commonly prescribed under brand name Keflex) is a cephalosporin antibiotic which is active against numerous classes of bacteria, including most skin bacteria. Side effects may include nausea, diarrhea, gastrointestinal upset, rash, hives, yeast infections, and in rare cases, hepatitis, kidney disease, seizures, fever, confusion, neurologic symptoms, and others. Patients with severe allergies to penicillin medications are cautioned that there is about a 10% incidence of cross-reactivity with cephalosporins. When possible, patients with penicillin allergies should use alternatives to cephalosporins for antibiotic therapy.
High Dose Vitamin A Pregnancy And Lactation Text: High dose vitamin A therapy is contraindicated during pregnancy and breast feeding.
Acitretin Counseling:  I discussed with the patient the risks of acitretin including but not limited to hair loss, dry lips/skin/eyes, liver damage, hyperlipidemia, depression/suicidal ideation, photosensitivity.  Serious rare side effects can include but are not limited to pancreatitis, pseudotumor cerebri, bony changes, clot formation/stroke/heart attack.  Patient understands that alcohol is contraindicated since it can result in liver toxicity and significantly prolong the elimination of the drug by many years.
Terbinafine Counseling: Patient counseling regarding adverse effects of terbinafine including but not limited to headache, diarrhea, rash, upset stomach, liver function test abnormalities, itching, taste/smell disturbance, nausea, abdominal pain, and flatulence.  There is a rare possibility of liver failure that can occur when taking terbinafine.  The patient understands that a baseline LFT and kidney function test may be required. The patient verbalized understanding of the proper use and possible adverse effects of terbinafine.  All of the patient's questions and concerns were addressed.
Tranexamic Acid Pregnancy And Lactation Text: It is unknown if this medication is safe during pregnancy or breast feeding.
Libtayo Pregnancy And Lactation Text: This medication is contraindicated in pregnancy and when breast feeding.
Niacinamide Counseling: I recommended taking niacin or niacinamide, also know as vitamin B3, twice daily. Recent evidence suggests that taking vitamin B3 (500 mg twice daily) can reduce the risk of actinic keratoses and non-melanoma skin cancers. Side effects of vitamin B3 include flushing and headache.
Topical Sulfur Applications Pregnancy And Lactation Text: This medication is considered safe during pregnancy and breast feeding secondary to limited systemic absorption.
Dapsone Pregnancy And Lactation Text: This medication is Pregnancy Category C and is not considered safe during pregnancy or breast feeding.
Olumiant Counseling: I discussed with the patient the risks of Olumiant therapy including but not limited to upper respiratory tract infections, shingles, cold sores, and nausea. Live vaccines should be avoided.  This medication has been linked to serious infections; higher rate of mortality; malignancy and lymphoproliferative disorders; major adverse cardiovascular events; thrombosis; gastrointestinal perforations; neutropenia; lymphopenia; anemia; liver enzyme elevations; and lipid elevations.
Calcipotriene Counseling:  I discussed with the patient the risks of calcipotriene including but not limited to erythema, scaling, itching, and irritation.
Rituxan Pregnancy And Lactation Text: This medication is Pregnancy Category C and it isn't know if it is safe during pregnancy. It is unknown if this medication is excreted in breast milk but similar antibodies are known to be excreted.
Cyclosporine Pregnancy And Lactation Text: This medication is Pregnancy Category C and it isn't know if it is safe during pregnancy. This medication is excreted in breast milk.
Rhofade Counseling: Rhofade is a topical medication which can decrease superficial blood flow where applied. Side effects are uncommon and include stinging, redness and allergic reactions.
Spironolactone Counseling: Patient advised regarding risks of diarrhea, abdominal pain, hyperkalemia, birth defects (for female patients), liver toxicity and renal toxicity. The patient may need blood work to monitor liver and kidney function and potassium levels while on therapy. The patient verbalized understanding of the proper use and possible adverse effects of spironolactone.  All of the patient's questions and concerns were addressed.
Opioid Counseling: I discussed with the patient the potential side effects of opioids including but not limited to addiction, altered mental status, and depression. I stressed avoiding alcohol, benzodiazepines, muscle relaxants and sleep aids unless specifically okayed by a physician. The patient verbalized understanding of the proper use and possible adverse effects of opioids. All of the patient's questions and concerns were addressed. They were instructed to flush the remaining pills down the toilet if they did not need them for pain.
Cephalexin Pregnancy And Lactation Text: This medication is Pregnancy Category B and considered safe during pregnancy.  It is also excreted in breast milk but can be used safely for shorter doses.
Topical Clindamycin Counseling: Patient counseled that this medication may cause skin irritation or allergic reactions.  In the event of skin irritation, the patient was advised to reduce the amount of the drug applied or use it less frequently.   The patient verbalized understanding of the proper use and possible adverse effects of clindamycin.  All of the patient's questions and concerns were addressed.
Acitretin Pregnancy And Lactation Text: This medication is Pregnancy Category X and should not be given to women who are pregnant or may become pregnant in the future. This medication is excreted in breast milk.
Mirvaso Pregnancy And Lactation Text: This medication has not been assigned a Pregnancy Risk Category. It is unknown if the medication is excreted in breast milk.
Albendazole Pregnancy And Lactation Text: This medication is Pregnancy Category C and it isn't known if it is safe during pregnancy. It is also excreted in breast milk.
Minocycline Counseling: Patient advised regarding possible photosensitivity and discoloration of the teeth, skin, lips, tongue and gums.  Patient instructed to avoid sunlight, if possible.  When exposed to sunlight, patients should wear protective clothing, sunglasses, and sunscreen.  The patient was instructed to call the office immediately if the following severe adverse effects occur:  hearing changes, easy bruising/bleeding, severe headache, or vision changes.  The patient verbalized understanding of the proper use and possible adverse effects of minocycline.  All of the patient's questions and concerns were addressed.
Dupixent Counseling: I discussed with the patient the risks of dupilumab including but not limited to eye inflammation and irritation, cold sores, injection site reactions, allergic reactions and increased risk of parasitic infection. The patient understands that monitoring is required and they must alert us or the primary physician if symptoms of infection or other concerning signs are noted.
Oxybutynin Counseling:  I discussed with the patient the risks of oxybutynin including but not limited to skin rash, drowsiness, dry mouth, difficulty urinating, and blurred vision.
Niacinamide Pregnancy And Lactation Text: These medications are considered safe during pregnancy.
Hydroquinone Counseling:  Patient advised that medication may result in skin irritation, lightening (hypopigmentation), dryness, and burning.  In the event of skin irritation, the patient was advised to reduce the amount of the drug applied or use it less frequently.  Rarely, spots that are treated with hydroquinone can become darker (pseudoochronosis).  Should this occur, patient instructed to stop medication and call the office. The patient verbalized understanding of the proper use and possible adverse effects of hydroquinone.  All of the patient's questions and concerns were addressed.
Calcipotriene Pregnancy And Lactation Text: The use of this medication during pregnancy or lactation is not recommended as there is insufficient data.
Olumiant Pregnancy And Lactation Text: Based on animal studies, Olumiant may cause embryo-fetal harm when administered to pregnant women.  The medication should not be used in pregnancy.  Breastfeeding is not recommended during treatment.
Opioid Pregnancy And Lactation Text: These medications can lead to premature delivery and should be avoided during pregnancy. These medications are also present in breast milk in small amounts.
Valtrex Counseling: I discussed with the patient the risks of valacyclovir including but not limited to kidney damage, nausea, vomiting and severe allergy.  The patient understands that if the infection seems to be worsening or is not improving, they are to call.
Wartpeel Counseling:  I discussed with the patient the risks of Wartpeel including but not limited to erythema, scaling, itching, weeping, crusting, and pain.
Gabapentin Counseling: I discussed with the patient the risks of gabapentin including but not limited to dizziness, somnolence, fatigue and ataxia.
Methotrexate Counseling:  Patient counseled regarding adverse effects of methotrexate including but not limited to nausea, vomiting, abnormalities in liver function tests. Patients may develop mouth sores, rash, diarrhea, and abnormalities in blood counts. The patient understands that monitoring is required including LFT's and blood counts.  There is a rare possibility of scarring of the liver and lung problems that can occur when taking methotrexate. Persistent nausea, loss of appetite, pale stools, dark urine, cough, and shortness of breath should be reported immediately. Patient advised to discontinue methotrexate treatment at least three months before attempting to become pregnant.  I discussed the need for folate supplements while taking methotrexate.  These supplements can decrease side effects during methotrexate treatment. The patient verbalized understanding of the proper use and possible adverse effects of methotrexate.  All of the patient's questions and concerns were addressed.
Cantharidin Counseling:  I discussed with the patient the risks of Cantharidin including but not limited to pain, redness, burning, itching, and blistering.
Spironolactone Pregnancy And Lactation Text: This medication can cause feminization of the male fetus and should be avoided during pregnancy. The active metabolite is also found in breast milk.
Odomzo Counseling- I discussed with the patient the risks of Odomzo including but not limited to nausea, vomiting, diarrhea, constipation, weight loss, changes in the sense of taste, decreased appetite, muscle spasms, and hair loss.  The patient verbalized understanding of the proper use and possible adverse effects of Odomzo.  All of the patient's questions and concerns were addressed.
Terbinafine Pregnancy And Lactation Text: This medication is Pregnancy Category B and is considered safe during pregnancy. It is also excreted in breast milk and breast feeding isn't recommended.
Siliq Counseling:  I discussed with the patient the risks of Siliq including but not limited to new or worsening depression, suicidal thoughts and behavior, immunosuppression, malignancy, posterior leukoencephalopathy syndrome, and serious infections.  The patient understands that monitoring is required including a PPD at baseline and must alert us or the primary physician if symptoms of infection or other concerning signs are noted. There is also a special program designed to monitor depression which is required with Siliq.
Bexarotene Counseling:  I discussed with the patient the risks of bexarotene including but not limited to hair loss, dry lips/skin/eyes, liver abnormalities, hyperlipidemia, pancreatitis, depression/suicidal ideation, photosensitivity, drug rash/allergic reactions, hypothyroidism, anemia, leukopenia, infection, cataracts, and teratogenicity.  Patient understands that they will need regular blood tests to check lipid profile, liver function tests, white blood cell count, thyroid function tests and pregnancy test if applicable.
Opzelura Counseling:  I discussed with the patient the risks of Opzelura including but not limited to nasopharngitis, bronchitis, ear infection, eosinophila, hives, diarrhea, folliculitis, tonsillitis, and rhinorrhea.  Taken orally, this medication has been linked to serious infections; higher rate of mortality; malignancy and lymphoproliferative disorders; major adverse cardiovascular events; thrombosis; thrombocytopenia, anemia, and neutropenia; and lipid elevations.
Fluconazole Counseling:  Patient counseled regarding adverse effects of fluconazole including but not limited to headache, diarrhea, nausea, upset stomach, liver function test abnormalities, taste disturbance, and stomach pain.  There is a rare possibility of liver failure that can occur when taking fluconazole.  The patient understands that monitoring of LFTs and kidney function test may be required, especially at baseline. The patient verbalized understanding of the proper use and possible adverse effects of fluconazole.  All of the patient's questions and concerns were addressed.
Oxybutynin Pregnancy And Lactation Text: This medication is Pregnancy Category B and is considered safe during pregnancy. It is unknown if it is excreted in breast milk.
Nsaids Counseling: NSAID Counseling: I discussed with the patient that NSAIDs should be taken with food. Prolonged use of NSAIDs can result in the development of stomach ulcers.  Patient advised to stop taking NSAIDs if abdominal pain occurs.  The patient verbalized understanding of the proper use and possible adverse effects of NSAIDs.  All of the patient's questions and concerns were addressed.
Dupixent Pregnancy And Lactation Text: This medication likely crosses the placenta but the risk for the fetus is uncertain. This medication is excreted in breast milk.
Clindamycin Counseling: I counseled the patient regarding use of clindamycin as an antibiotic for prophylactic and/or therapeutic purposes. Clindamycin is active against numerous classes of bacteria, including skin bacteria. Side effects may include nausea, diarrhea, gastrointestinal upset, rash, hives, yeast infections, and in rare cases, colitis.
Cantharidin Pregnancy And Lactation Text: This medication has not been proven safe during pregnancy. It is unknown if this medication is excreted in breast milk.
Tremfya Counseling: I discussed with the patient the risks of guselkumab including but not limited to immunosuppression, serious infections, and drug reactions.  The patient understands that monitoring is required including a PPD at baseline and must alert us or the primary physician if symptoms of infection or other concerning signs are noted.
Aklief counseling:  Patient advised to apply a pea-sized amount only at bedtime and wait 30 minutes after washing their face before applying.  If too drying, patient may add a non-comedogenic moisturizer.  The most commonly reported side effects including irritation, redness, scaling, dryness, stinging, burning, itching, and increased risk of sunburn.  The patient verbalized understanding of the proper use and possible adverse effects of retinoids.  All of the patient's questions and concerns were addressed.
Solaraze Counseling:  I discussed with the patient the risks of Solaraze including but not limited to erythema, scaling, itching, weeping, crusting, and pain.
Methotrexate Pregnancy And Lactation Text: This medication is Pregnancy Category X and is known to cause fetal harm. This medication is excreted in breast milk.
Include Pregnancy/Lactation Warning?: No
Ivermectin Counseling:  Patient instructed to take medication on an empty stomach with a full glass of water.  Patient informed of potential adverse effects including but not limited to nausea, diarrhea, dizziness, itching, and swelling of the extremities or lymph nodes.  The patient verbalized understanding of the proper use and possible adverse effects of ivermectin.  All of the patient's questions and concerns were addressed.
Quinolones Counseling:  I discussed with the patient the risks of fluoroquinolones including but not limited to GI upset, allergic reaction, drug rash, diarrhea, dizziness, photosensitivity, yeast infections, liver function test abnormalities, tendonitis/tendon rupture.
Rinvoq Counseling: I discussed with the patient the risks of Rinvoq therapy including but not limited to upper respiratory tract infections, shingles, cold sores, bronchitis, nausea, cough, fever, acne, and headache. Live vaccines should be avoided.  This medication has been linked to serious infections; higher rate of mortality; malignancy and lymphoproliferative disorders; major adverse cardiovascular events; thrombosis; thrombocytopenia, anemia, and neutropenia; lipid elevations; liver enzyme elevations; and gastrointestinal perforations.
Topical Ketoconazole Counseling: Patient counseled that this medication may cause skin irritation or allergic reactions.  In the event of skin irritation, the patient was advised to reduce the amount of the drug applied or use it less frequently.   The patient verbalized understanding of the proper use and possible adverse effects of ketoconazole.  All of the patient's questions and concerns were addressed.
5-Fu Counseling: 5-Fluorouracil Counseling:  I discussed with the patient the risks of 5-fluorouracil including but not limited to erythema, scaling, itching, weeping, crusting, and pain.
Valtrex Pregnancy And Lactation Text: this medication is Pregnancy Category B and is considered safe during pregnancy. This medication is not directly found in breast milk but it's metabolite acyclovir is present.
Arava Counseling:  Patient counseled regarding adverse effects of Arava including but not limited to nausea, vomiting, abnormalities in liver function tests. Patients may develop mouth sores, rash, diarrhea, and abnormalities in blood counts. The patient understands that monitoring is required including LFTs and blood counts.  There is a rare possibility of scarring of the liver and lung problems that can occur when taking methotrexate. Persistent nausea, loss of appetite, pale stools, dark urine, cough, and shortness of breath should be reported immediately. Patient advised to discontinue Arava treatment and consult with a physician prior to attempting conception. The patient will have to undergo a treatment to eliminate Arava from the body prior to conception.
Propranolol Counseling:  I discussed with the patient the risks of propranolol including but not limited to low heart rate, low blood pressure, low blood sugar, restlessness and increased cold sensitivity. They should call the office if they experience any of these side effects.
Azathioprine Counseling:  I discussed with the patient the risks of azathioprine including but not limited to myelosuppression, immunosuppression, hepatotoxicity, lymphoma, and infections.  The patient understands that monitoring is required including baseline LFTs, Creatinine, possible TPMP genotyping and weekly CBCs for the first month and then every 2 weeks thereafter.  The patient verbalized understanding of the proper use and possible adverse effects of azathioprine.  All of the patient's questions and concerns were addressed.
Aklief Pregnancy And Lactation Text: It is unknown if this medication is safe to use during pregnancy.  It is unknown if this medication is excreted in breast milk.  Breastfeeding women should use the topical cream on the smallest area of the skin for the shortest time needed while breastfeeding.  Do not apply to nipple and areola.
Opzelura Pregnancy And Lactation Text: There is insufficient data to evaluate drug-associated risk for major birth defects, miscarriage, or other adverse maternal or fetal outcomes.  There is a pregnancy registry that monitors pregnancy outcomes in pregnant persons exposed to the medication during pregnancy.  It is unknown if this medication is excreted in breast milk.  Do not breastfeed during treatment and for about 4 weeks after the last dose.
Bexarotene Pregnancy And Lactation Text: This medication is Pregnancy Category X and should not be given to women who are pregnant or may become pregnant. This medication should not be used if you are breast feeding.
Imiquimod Counseling:  I discussed with the patient the risks of imiquimod including but not limited to erythema, scaling, itching, weeping, crusting, and pain.  Patient understands that the inflammatory response to imiquimod is variable from person to person and was educated regarded proper titration schedule.  If flu-like symptoms develop, patient knows to discontinue the medication and contact us.
Winlevi Counseling:  I discussed with the patient the risks of topical clascoterone including but not limited to erythema, scaling, itching, and stinging. Patient voiced their understanding.
Clindamycin Pregnancy And Lactation Text: This medication can be used in pregnancy if certain situations. Clindamycin is also present in breast milk.
Cimetidine Counseling:  I discussed with the patient the risks of Cimetidine including but not limited to gynecomastia, headache, diarrhea, nausea, drowsiness, arrhythmias, pancreatitis, skin rashes, psychosis, bone marrow suppression and kidney toxicity.
Nsaids Pregnancy And Lactation Text: These medications are considered safe up to 30 weeks gestation. It is excreted in breast milk.
Glycopyrrolate Counseling:  I discussed with the patient the risks of glycopyrrolate including but not limited to skin rash, drowsiness, dry mouth, difficulty urinating, and blurred vision.
Simponi Counseling:  I discussed with the patient the risks of golimumab including but not limited to myelosuppression, immunosuppression, autoimmune hepatitis, demyelinating diseases, lymphoma, and serious infections.  The patient understands that monitoring is required including a PPD at baseline and must alert us or the primary physician if symptoms of infection or other concerning signs are noted.
Enbrel Counseling:  I discussed with the patient the risks of etanercept including but not limited to myelosuppression, immunosuppression, autoimmune hepatitis, demyelinating diseases, lymphoma, and infections.  The patient understands that monitoring is required including a PPD at baseline and must alert us or the primary physician if symptoms of infection or other concerning signs are noted.
Solaraze Pregnancy And Lactation Text: This medication is Pregnancy Category B and is considered safe. There is some data to suggest avoiding during the third trimester. It is unknown if this medication is excreted in breast milk.
Prednisone Counseling:  I discussed with the patient the risks of prolonged use of prednisone including but not limited to weight gain, insomnia, osteoporosis, mood changes, diabetes, susceptibility to infection, glaucoma and high blood pressure.  In cases where prednisone use is prolonged, patients should be monitored with blood pressure checks, serum glucose levels and an eye exam.  Additionally, the patient may need to be placed on GI prophylaxis, PCP prophylaxis, and calcium and vitamin D supplementation and/or a bisphosphonate.  The patient verbalized understanding of the proper use and the possible adverse effects of prednisone.  All of the patient's questions and concerns were addressed.
Propranolol Pregnancy And Lactation Text: This medication is Pregnancy Category C and it isn't known if it is safe during pregnancy. It is excreted in breast milk.
Azelaic Acid Counseling: Patient counseled that medicine may cause skin irritation and to avoid applying near the eyes.  In the event of skin irritation, the patient was advised to reduce the amount of the drug applied or use it less frequently.   The patient verbalized understanding of the proper use and possible adverse effects of azelaic acid.  All of the patient's questions and concerns were addressed.
Picato Counseling:  I discussed with the patient the risks of Picato including but not limited to erythema, scaling, itching, weeping, crusting, and pain.
Griseofulvin Counseling:  I discussed with the patient the risks of griseofulvin including but not limited to photosensitivity, cytopenia, liver damage, nausea/vomiting and severe allergy.  The patient understands that this medication is best absorbed when taken with a fatty meal (e.g., ice cream or french fries).
Isotretinoin Counseling: Patient should get monthly blood tests, not donate blood, not drive at night if vision affected, not share medication, and not undergo elective surgery for 6 months after tx completed. Side effects reviewed, pt to contact office should one occur.
Dutasteride Male Counseling: Dustasteride Counseling:  I discussed with the patient the risks of use of dutasteride including but not limited to decreased libido, decreased ejaculate volume, and gynecomastia. Women who can become pregnant should not handle medication.  All of the patient's questions and concerns were addressed.
Rinvoq Pregnancy And Lactation Text: Based on animal studies, Rinvoq may cause embryo-fetal harm when administered to pregnant women.  The medication should not be used in pregnancy.  Breastfeeding is not recommended during treatment and for 6 days after the last dose.
Olanzapine Counseling- I discussed with the patient the common side effects of olanzapine including but are not limited to: lack of energy, dry mouth, increased appetite, sleepiness, tremor, constipation, dizziness, changes in behavior, or restlessness.  Explained that teenagers are more likely to experience headaches, abdominal pain, pain in the arms or legs, tiredness, and sleepiness.  Serious side effects include but are not limited: increased risk of death in elderly patients who are confused, have memory loss, or dementia-related psychosis; hyperglycemia; increased cholesterol and triglycerides; and weight gain.
Xolair Counseling:  Patient informed of potential adverse effects including but not limited to fever, muscle aches, rash and allergic reactions.  The patient verbalized understanding of the proper use and possible adverse effects of Xolair.  All of the patient's questions and concerns were addressed.
Doxycycline Counseling:  Patient counseled regarding possible photosensitivity and increased risk for sunburn.  Patient instructed to avoid sunlight, if possible.  When exposed to sunlight, patients should wear protective clothing, sunglasses, and sunscreen.  The patient was instructed to call the office immediately if the following severe adverse effects occur:  hearing changes, easy bruising/bleeding, severe headache, or vision changes.  The patient verbalized understanding of the proper use and possible adverse effects of doxycycline.  All of the patient's questions and concerns were addressed.
Adbry Counseling: I discussed with the patient the risks of tralokinumab including but not limited to eye infection and irritation, cold sores, injection site reactions, worsening of asthma, allergic reactions and increased risk of parasitic infection.  Live vaccines should be avoided while taking tralokinumab. The patient understands that monitoring is required and they must alert us or the primary physician if symptoms of infection or other concerning signs are noted.
Glycopyrrolate Pregnancy And Lactation Text: This medication is Pregnancy Category B and is considered safe during pregnancy. It is unknown if it is excreted breast milk.
Winlevi Pregnancy And Lactation Text: This medication is considered safe during pregnancy and breastfeeding.
Isotretinoin Pregnancy And Lactation Text: This medication is Pregnancy Category X and is considered extremely dangerous during pregnancy. It is unknown if it is excreted in breast milk.
Cellcept Counseling:  I discussed with the patient the risks of mycophenolate mofetil including but not limited to infection/immunosuppression, GI upset, hypokalemia, hypercholesterolemia, bone marrow suppression, lymphoproliferative disorders, malignancy, GI ulceration/bleed/perforation, colitis, interstitial lung disease, kidney failure, progressive multifocal leukoencephalopathy, and birth defects.  The patient understands that monitoring is required including a baseline creatinine and regular CBC testing. In addition, patient must alert us immediately if symptoms of infection or other concerning signs are noted.
Griseofulvin Pregnancy And Lactation Text: This medication is Pregnancy Category X and is known to cause serious birth defects. It is unknown if this medication is excreted in breast milk but breast feeding should be avoided.
Sotyktu Counseling:  I discussed the most common side effects of Sotyktu including: common cold, sore throat, sinus infections, cold sores, canker sores, folliculitis, and acne.  I also discussed more serious side effects of Sotyktu including but not limited to: serious allergic reactions; increased risk for infections such as TB; cancers such as lymphomas; rhabdomyolysis and elevated CPK; and elevated triglycerides and liver enzymes. 
Dutasteride Pregnancy And Lactation Text: This medication is absolutely contraindicated in women, especially during pregnancy and breast feeding. Feminization of male fetuses is possible if taking while pregnant.
Drysol Counseling:  I discussed with the patient the risks of drysol/aluminum chloride including but not limited to skin rash, itching, irritation, burning.
Soolantra Counseling: I discussed with the patients the risks of topial Soolantra. This is a medicine which decreases the number of mites and inflammation in the skin. You experience burning, stinging, eye irritation or allergic reactions.  Please call our office if you develop any problems from using this medication.
Rifampin Counseling: I discussed with the patient the risks of rifampin including but not limited to liver damage, kidney damage, red-orange body fluids, nausea/vomiting and severe allergy.
Humira Counseling:  I discussed with the patient the risks of adalimumab including but not limited to myelosuppression, immunosuppression, autoimmune hepatitis, demyelinating diseases, lymphoma, and serious infections.  The patient understands that monitoring is required including a PPD at baseline and must alert us or the primary physician if symptoms of infection or other concerning signs are noted.
Doxycycline Pregnancy And Lactation Text: This medication is Pregnancy Category D and not consider safe during pregnancy. It is also excreted in breast milk but is considered safe for shorter treatment courses.
Doxepin Counseling:  Patient advised that the medication is sedating and not to drive a car after taking this medication. Patient informed of potential adverse effects including but not limited to dry mouth, urinary retention, and blurry vision.  The patient verbalized understanding of the proper use and possible adverse effects of doxepin.  All of the patient's questions and concerns were addressed.
Soolantra Pregnancy And Lactation Text: This medication is Pregnancy Category C. This medication is considered safe during breast feeding.
Clofazimine Counseling:  I discussed with the patient the risks of clofazimine including but not limited to skin and eye pigmentation, liver damage, nausea/vomiting, gastrointestinal bleeding and allergy.
SSKI Counseling:  I discussed with the patient the risks of SSKI including but not limited to thyroid abnormalities, metallic taste, GI upset, fever, headache, acne, arthralgias, paraesthesias, lymphadenopathy, easy bleeding, arrhythmias, and allergic reaction.
Zoryve Counseling:  I discussed with the patient that Zoryve is not for use in the eyes, mouth or vagina. The most commonly reported side effects include diarrhea, headache, insomnia, application site pain, upper respiratory tract infections, and urinary tract infections.  All of the patient's questions and concerns were addressed.
Olanzapine Pregnancy And Lactation Text: This medication is pregnancy category C.   There are no adequate and well controlled trials with olanzapine in pregnant females.  Olanzapine should be used during pregnancy only if the potential benefit justifies the potential risk to the fetus.   In a study in lactating healthy women, olanzapine was excreted in breast milk.  It is recommended that women taking olanzapine should not breast feed.
Topical Metronidazole Counseling: Metronidazole is a topical antibiotic medication. You may experience burning, stinging, redness, or allergic reactions.  Please call our office if you develop any problems from using this medication.
Klisyri Counseling:  I discussed with the patient the risks of Klisyri including but not limited to erythema, scaling, itching, weeping, crusting, and pain.
Xolair Pregnancy And Lactation Text: This medication is Pregnancy Category B and is considered safe during pregnancy. This medication is excreted in breast milk.

## 2023-12-21 ENCOUNTER — APPOINTMENT (OUTPATIENT)
Dept: DERMATOLOGY | Facility: CLINIC | Age: 68
End: 2023-12-21

## 2024-03-01 NOTE — PHYSICAL THERAPY INITIAL EVALUATION ADULT - LEVEL OF INDEPENDENCE: SIT/STAND, REHAB EVAL
"Anesthesia Transfer of Care Note    Patient: Davonte Edwards    Procedure(s) Performed: Procedure(s) (LRB):  CYSTOSCOPY, WITH URETERAL STENT INSERTION (Left)    Patient location: PACU    Anesthesia Type: general    Transport from OR: Transported from OR on 2-3 L/min O2 by NC with adequate spontaneous ventilation    Post pain: adequate analgesia    Post assessment: no apparent anesthetic complications    Post vital signs: stable    Level of consciousness: awake    Nausea/Vomiting: no nausea/vomiting    Complications: none    Transfer of care protocol was followed      Last vitals: Visit Vitals  BP (!) 144/78 (BP Location: Right arm, Patient Position: Lying)   Pulse 98   Temp 37.1 °C (98.8 °F) (Temporal)   Resp 16   Ht 5' 8" (1.727 m)   Wt 86.2 kg (190 lb)   SpO2 98%   BMI 28.89 kg/m²     " independent

## 2024-03-08 ENCOUNTER — APPOINTMENT (RX ONLY)
Dept: URBAN - METROPOLITAN AREA CLINIC 116 | Facility: CLINIC | Age: 69
Setting detail: DERMATOLOGY
End: 2024-03-08

## 2024-03-08 DIAGNOSIS — L663 OTHER SPECIFIED DISEASES OF HAIR AND HAIR FOLLICLES: ICD-10-CM | Status: WELL CONTROLLED

## 2024-03-08 DIAGNOSIS — L738 OTHER SPECIFIED DISEASES OF HAIR AND HAIR FOLLICLES: ICD-10-CM | Status: WELL CONTROLLED

## 2024-03-08 DIAGNOSIS — L73.9 FOLLICULAR DISORDER, UNSPECIFIED: ICD-10-CM | Status: WELL CONTROLLED

## 2024-03-08 DIAGNOSIS — L81.0 POSTINFLAMMATORY HYPERPIGMENTATION: ICD-10-CM

## 2024-03-08 PROBLEM — L02.32 FURUNCLE OF BUTTOCK: Status: ACTIVE | Noted: 2024-03-08

## 2024-03-08 PROCEDURE — ? MEDICATION COUNSELING

## 2024-03-08 PROCEDURE — 99213 OFFICE O/P EST LOW 20 MIN: CPT

## 2024-03-08 PROCEDURE — ? PRESCRIPTION MEDICATION MANAGEMENT

## 2024-03-08 PROCEDURE — ? COUNSELING

## 2024-03-08 ASSESSMENT — LOCATION DETAILED DESCRIPTION DERM
LOCATION DETAILED: RIGHT BUTTOCK
LOCATION DETAILED: LEFT BUTTOCK

## 2024-03-08 ASSESSMENT — LOCATION ZONE DERM: LOCATION ZONE: TRUNK

## 2024-03-08 ASSESSMENT — LOCATION SIMPLE DESCRIPTION DERM
LOCATION SIMPLE: LEFT BUTTOCK
LOCATION SIMPLE: RIGHT BUTTOCK

## 2024-03-08 NOTE — PROCEDURE: PRESCRIPTION MEDICATION MANAGEMENT
Render In Strict Bullet Format?: No
Detail Level: Zone
Continue Regimen: Benzoyl Peroxide 10% every day. Clindamycin as needed for any flares

## 2024-03-08 NOTE — PROCEDURE: MIPS QUALITY
Detail Level: Detailed
Name And Contact Information For Health Care Proxy: Marissa Mckenzie [phone]
Quality 47: Advance Care Plan: Advance Care Planning discussed and documented; advance care plan or surrogate decision maker documented in the medical record.

## 2024-04-09 NOTE — PATIENT PROFILE ADULT - DEAF OR HARD OF HEARING?
----- Message from Michaela Montana MD sent at 4/8/2024  8:30 AM EDT -----  Notify Guillermina that her Quantiferon Gold test is negative. CG   no

## 2024-06-07 ENCOUNTER — APPOINTMENT (OUTPATIENT)
Dept: FAMILY MEDICINE | Facility: CLINIC | Age: 69
End: 2024-06-07

## 2024-07-24 NOTE — OCCUPATIONAL THERAPY INITIAL EVALUATION ADULT - MANUAL MUSCLE TESTING RESULTS, REHAB EVAL
90 day refill   [facility]   Next OV 8/21    
BUE grossly assessed within spinal precautions WFL

## 2024-08-08 ENCOUNTER — APPOINTMENT (OUTPATIENT)
Dept: FAMILY MEDICINE | Facility: CLINIC | Age: 69
End: 2024-08-08

## 2024-08-08 PROBLEM — Z95.1 S/P CABG X 1: Status: ACTIVE | Noted: 2024-08-08

## 2024-08-08 PROCEDURE — G0439: CPT

## 2024-08-08 PROCEDURE — 99497 ADVNCD CARE PLAN 30 MIN: CPT

## 2025-01-07 NOTE — H&P PST ADULT - OTHER CARE PROVIDERS
- 2/2 PNA  - F/u blood cultures- NGTD  - ID consult placed- completed  CTX/AZT  - Legionella negative, Flu/ Covid/ RVP negative, legionella neg  - Afebrile Cardiologist

## 2025-08-15 ENCOUNTER — NON-APPOINTMENT (OUTPATIENT)
Age: 70
End: 2025-08-15

## (undated) DEVICE — GLV 8 PROTEXIS

## (undated) DEVICE — DRAPE SHEET LARGE 72X85"

## (undated) DEVICE — STRYKER SONOPET IQ TIP 12CM MICRO CLAW

## (undated) DEVICE — TUBING MALIS

## (undated) DEVICE — SUT PROLENE 3-0 36" SH

## (undated) DEVICE — BLANKET WARMER FULL ADULT

## (undated) DEVICE — DRAPE SPLIT SHEETS 77X108"

## (undated) DEVICE — SOL IRR POUR NS 0.9% 1000ML

## (undated) DEVICE — TUBING BIPOLAR IRRIGATOR AND CORD SET

## (undated) DEVICE — PREP SCRUB IODO DURAPREP 26CC

## (undated) DEVICE — MARKER SKIN MULTI TIP 6"

## (undated) DEVICE — DRAPE TOWEL 1000 SMALL 17" X 11"

## (undated) DEVICE — DISSECTING TOOL MIDAS REX 8CM 1.1MM

## (undated) DEVICE — FOLEY TRAY 16FR LF URINE METER SURESTEP

## (undated) DEVICE — NDL SPINAL 18G X 3.5"

## (undated) DEVICE — DRAPE TOWEL BLUE 17" X 24"

## (undated) DEVICE — SOL IRR POUR H2O 1000ML

## (undated) DEVICE — SUT MONOCRYL 3-0 27" PS-2 UNDYED

## (undated) DEVICE — SPONGE PEANUT AUTO COUNT

## (undated) DEVICE — DRAPE INSTRUMENT POUCH

## (undated) DEVICE — SUT VICRYL 3-0 18" SH UNDYED

## (undated) DEVICE — GLV 8 ESTEEM BLUE

## (undated) DEVICE — DRILL BIT MEDTRONIC

## (undated) DEVICE — STRYKER SONOPET IQ TUBING SET

## (undated) DEVICE — GLV 6.5 PROTEXIS

## (undated) DEVICE — DRAPE C ARM UNIVERSAL

## (undated) DEVICE — SUT DERMABOND 0.7ML

## (undated) DEVICE — DRAIN JACKSON PRATT 7FR ROUND END NO TROCAR

## (undated) DEVICE — DRAPE 3/4 SHEET 52X76"

## (undated) DEVICE — GLV 6.5 ESTEEM BLUE

## (undated) DEVICE — SUT VICRYL 2-0 18" CP-2 UNDYED

## (undated) DEVICE — WRAP COMPRESSION CALF MED